# Patient Record
Sex: MALE | Race: WHITE | NOT HISPANIC OR LATINO | Employment: UNEMPLOYED | URBAN - METROPOLITAN AREA
[De-identification: names, ages, dates, MRNs, and addresses within clinical notes are randomized per-mention and may not be internally consistent; named-entity substitution may affect disease eponyms.]

---

## 2020-08-15 ENCOUNTER — NURSE TRIAGE (OUTPATIENT)
Dept: ADMINISTRATIVE | Facility: CLINIC | Age: 40
End: 2020-08-15

## 2020-08-15 NOTE — TELEPHONE ENCOUNTER
"Patient states his girlfriend had sex with some one else, has pubic itching, wants to get tested. Options for [facility], [facility] and Urgent Care given. States he cannot wait until Monday. [facility] given for option for today. Strictly advised to practice " safe sex" as he states he has numerous partners, one of whom is pregnant. Call ended due to graphic nature of patient's description of video of his girlfriend's encounter with another partner. Patient did verb understanding of need to follow up and practice " safe sex"    Reason for Disposition   Severe itching   Questions about Sexually Transmitted Disease or Infection (STD, STI), but no symptoms   Molluscum Contagiosum (Genital), questions about   Symptoms of a Sexually Transmitted Disease or Infection (STD, STI)   Penis or scrotal symptoms (i.e., rash, sores, itching)   Patient is worried about a sexually transmitted disease (STD)    Additional Information   Negative: Followed a genital area injury   Negative: Pain or burning with passing urine is main symptom   Negative: Pain in scrotum or testicle is main symptom   Negative: Swollen scrotum OR lump in the scrotum/groin area   Negative: Pubic lice suspected   Negative: [1] Blood from end of penis AND [2] large amount   Negative: [1] Not circumcised AND [2] foreskin pulled back and stuck   Negative: [1] Looks infected (e.g., draining sore, ulcer, rash is painful to touch) AND [2] fever > 100.4 F (38.0 C)   Negative: [1] Unable to urinate (or only a few drops) > 4 hours AND [2] bladder feels very full (e.g., palpable bladder or strong urge to urinate)   Negative: [1] Prolonged unwanted erection (i.e., not related to sexual interest) AND [2] lasting > 4 hours   Negative: SEVERE pain (e.g., excruciating)   Negative: Patient sounds very sick or weak to the triager   Negative: [1] Pus or bloody discharge from the end of the penis AND [2] fever   Negative: [1] Pain or " burning with passing urine AND [2] fever > 100.4 F (38.0 C)   Negative: [1] Pain or burning with passing urine AND [2] side (flank) or back pain present   Negative: Entire penis is swollen (i.e., edema)   Negative: [1] Antibiotic treatment > 3 days for STD (e.g., penile discharge from gonorrhea, chlamydia) AND [2] painful urination not improved   Negative: Pus (white, yellow) or bloody discharge from end of penis   Negative: Pain or burning with passing urine   Negative: [1] Not circumcised AND [2] swollen foreskin   Negative: [1] Tiny water blisters rash AND [2] 3 or more   Negative: Looks infected (e.g., draining sore, ulcer, rash is painful to touch)   Negative: Blood in urine (red, pink, or tea-colored)   Negative: Vaginal discharge   Negative: Vulvar symptoms (i.e., sores, redness, blisters, lumps, itching)   Negative: Penis discharge   Negative: Followed a genital area injury   Negative: Pain or burning with passing urine is main symptom   Negative: Pain in scrotum or testicle is main symptom   Negative: Swollen scrotum OR lump in the scrotum/groin area   Negative: Pubic lice suspected   Negative: [1] Blood from end of penis AND [2] large amount   Negative: [1] Not circumcised AND [2] foreskin pulled back and stuck   Negative: [1] Looks infected (e.g., draining sore, ulcer, rash is painful to touch) AND [2] fever > 100.5 F (38.1 C)   Negative: [1] Unable to urinate (or only a few drops) > 4 hours AND     [2] bladder feels very full (e.g., palpable bladder or strong urge to urinate)   Negative: [1] Erection AND [2] present > 4 hours   Negative: Patient sounds very sick or weak to the triager   Negative: [1] Pus or bloody discharge from the end of the penis AND [2] fever   Negative: [1] Pain or burning with passing urine AND [2] fever > 100.5 F (38.1 C)   Negative: [1] Pain or burning with passing urine AND [2] side (flank) or back pain present   Negative: Entire penis is swollen  (i.e., edema)   Negative: [1] Antibiotic treatment > 3 days for STD (e.g., penile discharge from gonorrhea, chlamydia)    AND [2] painful urination not improved   Negative: Pus (white, yellow) or bloody discharge from end of penis   Negative: Pain or burning with passing urine   Negative: [1] Not circumcised AND [2] swollen foreskin   Negative: [1] Tiny water blisters rash AND [2] 3 or more   Negative: Looks infected (e.g., draining sore, ulcer, rash is painful to touch)   Negative: Blood in urine (red, pink, or tea-colored)   Negative: [1] Painless rash (e.g., redness, tiny bumps, sore) AND [2] present > 24 hours    Protocols used: PENIS AND SCROTUM SYMPTOMS-A-, ST STDS - GUIDELINE [id number], ST STD [id number], ST PENIS AND SCROTUM SYMPTOMS-A-

## 2025-03-02 ENCOUNTER — HOSPITAL ENCOUNTER (EMERGENCY)
Facility: HOSPITAL | Age: 45
Discharge: HOME | End: 2025-03-02
Attending: EMERGENCY MEDICINE

## 2025-03-02 VITALS
HEIGHT: 67 IN | RESPIRATION RATE: 18 BRPM | TEMPERATURE: 98 F | HEART RATE: 98 BPM | OXYGEN SATURATION: 100 % | WEIGHT: 200 LBS | DIASTOLIC BLOOD PRESSURE: 98 MMHG | SYSTOLIC BLOOD PRESSURE: 125 MMHG | BODY MASS INDEX: 31.39 KG/M2

## 2025-03-02 DIAGNOSIS — K08.89 PAIN, DENTAL: Primary | ICD-10-CM

## 2025-03-02 PROCEDURE — 99284 EMERGENCY DEPT VISIT MOD MDM: CPT

## 2025-03-02 RX ORDER — KETOROLAC TROMETHAMINE 10 MG/1
10 TABLET, FILM COATED ORAL EVERY 6 HOURS PRN
Qty: 20 TABLET | Refills: 0 | Status: SHIPPED | OUTPATIENT
Start: 2025-03-02 | End: 2025-03-07

## 2025-03-02 RX ORDER — AMOXICILLIN 500 MG/1
500 TABLET, FILM COATED ORAL EVERY 12 HOURS
Qty: 20 TABLET | Refills: 0 | Status: SHIPPED | OUTPATIENT
Start: 2025-03-02 | End: 2025-03-12

## 2025-03-02 NOTE — DISCHARGE INSTRUCTIONS
Diagnosis:  Dental pain    You have several broken teeth, I am prescribing antibiotics to help clear any infection, amoxicillin as well as Toradol you can take as needed for pain. You should take Tylenol in addition to this as needed for pain up to 3 grams daily which is 6 of the 500 mg extra strength tablets.    Please call to schedule a follow up appointment with a dentist.  If you start to have any worsening symptoms please return to the emergency department.

## 2025-03-02 NOTE — Clinical Note
"Dirk Stroud "Dirk Mandelegler was seen and treated in our emergency department on 3/2/2025.  He may return to work on 03/03/2025.       If you have any questions or concerns, please don't hesitate to call.      Yesi Izaguirre PA-C"

## 2025-03-02 NOTE — ED PROVIDER NOTES
"Encounter Date: 3/2/2025       History     Chief Complaint   Patient presents with    Dental Pain     Both side in lower jaw     44-year-old male with history of dental infection presents for dental pain.  Reports that he has multiple teeth which need to be pulled but has been unable to obtain dental care as he has a new job.  Over the last 2 days he has had throbbing pain, mostly on the right side of his mouth and has been unable to work due to the pain.  He is requesting a work note.  He had "a pus pocket" which started to self drain.  He denies fever, swallowing or other complaints.      Review of patient's allergies indicates:  No Known Allergies  History reviewed. No pertinent past medical history.  History reviewed. No pertinent surgical history.  No family history on file.  Social History[1]  Review of Systems    Physical Exam     Initial Vitals [03/02/25 1050]   BP Pulse Resp Temp SpO2   (!) 125/98 98 18 97.5 °F (36.4 °C) 100 %      MAP       --         Physical Exam    Nursing note and vitals reviewed.  Constitutional: He appears well-developed and well-nourished.   HENT: Mouth/Throat: Abnormal dentition. Dental caries present. No dental abscesses.   Poor dentition with multiple broken teeth, multiple dental caries.  No dental abscess     Neurological: He is alert and oriented to person, place, and time.   Skin: Skin is warm and dry.         ED Course   Procedures  Labs Reviewed - No data to display         Imaging Results    None          Medications - No data to display  Medical Decision Making  44-year-old male presenting for dental pain.  /98 with otherwise normal vitals.  Nontoxic appearing.    Differential diagnosis includes broken teeth versus dental infection.  No clinical signs abscess.  Dental cement applied to broken teeth.  Will discharge with antibiotics, analgesics and referral information for dental clinics for follow-up. Stressed the importance of follow-up, strict ED return " precautions given.  Patient voiced understanding and is comfortable with discharge.     Risk  Prescription drug management.                                      Clinical Impression:  Final diagnoses:  [K08.89] Pain, dental (Primary)          ED Disposition Condition    Discharge Stable          ED Prescriptions       Medication Sig Dispense Start Date End Date Auth. Provider    amoxicillin (AMOXIL) 500 MG Tab Take 1 tablet (500 mg total) by mouth every 12 (twelve) hours. for 10 days 20 tablet 3/2/2025 3/12/2025 Yesi Izaguirre PA-C    ketorolac (TORADOL) 10 mg tablet Take 1 tablet (10 mg total) by mouth every 6 (six) hours as needed for Pain. 20 tablet 3/2/2025 3/7/2025 Yesi Izaguirre PA-C          Follow-up Information       Follow up With Specialties Details Why Contact [facility] - Dental Dental General Practice, Oral and Maxillofacial Surgery, Oral Surgery Schedule an appointment as soon as possible for a visit in 1 week  [address] [facility] [id number]  [phone]      [facility] - Emergency Dept Emergency Medicine Go to  If symptoms worsen [address] [facility] [address] [phone]               [1]   Social History  Tobacco Use    Smoking status: Never   Substance Use Topics    Alcohol use: No    Drug use: No        Yesi Izaguirre PA-C  03/02/25 8631

## 2025-05-08 ENCOUNTER — HOSPITAL ENCOUNTER (INPATIENT)
Facility: HOSPITAL | Age: 45
LOS: 6 days | Discharge: HOME | DRG: 269 | End: 2025-05-14
Attending: EMERGENCY MEDICINE | Admitting: SURGERY
Payer: MEDICAID

## 2025-05-08 ENCOUNTER — ANESTHESIA EVENT (OUTPATIENT)
Dept: SURGERY | Facility: HOSPITAL | Age: 45
End: 2025-05-08
Payer: MEDICAID

## 2025-05-08 ENCOUNTER — ANESTHESIA (OUTPATIENT)
Dept: SURGERY | Facility: HOSPITAL | Age: 45
End: 2025-05-08
Payer: MEDICAID

## 2025-05-08 DIAGNOSIS — I71.40 AAA (ABDOMINAL AORTIC ANEURYSM): ICD-10-CM

## 2025-05-08 DIAGNOSIS — I71.00 DISSECTION OF AORTA, UNSPECIFIED PORTION OF AORTA: ICD-10-CM

## 2025-05-08 DIAGNOSIS — R10.9 ABDOMINAL PAIN: ICD-10-CM

## 2025-05-08 DIAGNOSIS — I71.33 RUPTURED INFRARENAL ABDOMINAL AORTIC ANEURYSM (AAA): Primary | ICD-10-CM

## 2025-05-08 PROBLEM — I71.43 INFRARENAL ABDOMINAL AORTIC ANEURYSM (AAA) WITHOUT RUPTURE: Status: ACTIVE | Noted: 2025-05-08

## 2025-05-08 LAB
ABO + RH BLD: NORMAL
ABORH RETYPE: NORMAL
ABSOLUTE EOSINOPHIL (OHS): 0 K/UL
ABSOLUTE EOSINOPHIL (OHS): 0 K/UL
ABSOLUTE EOSINOPHIL (OHS): 0.01 K/UL
ABSOLUTE EOSINOPHIL (OHS): 0.02 K/UL
ABSOLUTE EOSINOPHIL (OHS): 0.05 K/UL
ABSOLUTE MONOCYTE (OHS): 0.78 K/UL (ref 0.3–1)
ABSOLUTE MONOCYTE (OHS): 1.23 K/UL (ref 0.3–1)
ABSOLUTE MONOCYTE (OHS): 1.31 K/UL (ref 0.3–1)
ABSOLUTE MONOCYTE (OHS): 1.4 K/UL (ref 0.3–1)
ABSOLUTE MONOCYTE (OHS): 1.41 K/UL (ref 0.3–1)
ABSOLUTE NEUTROPHIL COUNT (OHS): 12.19 K/UL (ref 1.8–7.7)
ABSOLUTE NEUTROPHIL COUNT (OHS): 12.53 K/UL (ref 1.8–7.7)
ABSOLUTE NEUTROPHIL COUNT (OHS): 15.19 K/UL (ref 1.8–7.7)
ABSOLUTE NEUTROPHIL COUNT (OHS): 17.6 K/UL (ref 1.8–7.7)
ABSOLUTE NEUTROPHIL COUNT (OHS): 8.91 K/UL (ref 1.8–7.7)
ALBUMIN SERPL BCP-MCNC: 2.3 G/DL (ref 3.5–5.2)
ALBUMIN SERPL BCP-MCNC: 2.3 G/DL (ref 3.5–5.2)
ALBUMIN SERPL BCP-MCNC: 3.9 G/DL (ref 3.5–5.2)
ALLENS TEST: ABNORMAL
ALLENS TEST: ABNORMAL
ALP SERPL-CCNC: 44 UNIT/L (ref 40–150)
ALP SERPL-CCNC: 48 UNIT/L (ref 40–150)
ALP SERPL-CCNC: 99 UNIT/L (ref 40–150)
ALT SERPL W/O P-5'-P-CCNC: 15 UNIT/L (ref 10–44)
ALT SERPL W/O P-5'-P-CCNC: 17 UNIT/L (ref 10–44)
ALT SERPL W/O P-5'-P-CCNC: 18 UNIT/L (ref 10–44)
AMPHET UR QL SCN: NEGATIVE
AMPHET UR QL SCN: NEGATIVE
ANION GAP (OHS): 14 MMOL/L (ref 8–16)
ANION GAP (OHS): 5 MMOL/L (ref 8–16)
ANION GAP (OHS): 6 MMOL/L (ref 8–16)
APTT PPP: 26.9 SECONDS (ref 21–32)
APTT PPP: 27.1 SECONDS (ref 21–32)
AST SERPL-CCNC: 17 UNIT/L (ref 11–45)
AST SERPL-CCNC: 31 UNIT/L (ref 11–45)
AST SERPL-CCNC: 47 UNIT/L (ref 11–45)
BACTERIA #/AREA URNS AUTO: ABNORMAL /HPF
BARBITURATE SCN PRESENT UR: NEGATIVE
BARBITURATE SCN PRESENT UR: NEGATIVE
BASOPHILS # BLD AUTO: 0.03 K/UL
BASOPHILS # BLD AUTO: 0.03 K/UL
BASOPHILS # BLD AUTO: 0.04 K/UL
BASOPHILS # BLD AUTO: 0.05 K/UL
BASOPHILS # BLD AUTO: 0.05 K/UL
BASOPHILS NFR BLD AUTO: 0.2 %
BASOPHILS NFR BLD AUTO: 0.4 %
BENZODIAZ UR QL SCN: ABNORMAL
BENZODIAZ UR QL SCN: NEGATIVE
BILIRUB SERPL-MCNC: 2.4 MG/DL (ref 0.1–1)
BILIRUB SERPL-MCNC: 4.3 MG/DL (ref 0.1–1)
BILIRUB SERPL-MCNC: 5.6 MG/DL (ref 0.1–1)
BILIRUB UR QL STRIP.AUTO: ABNORMAL
BLD PROD TYP BPU: NORMAL
BLOOD UNIT EXPIRATION DATE: NORMAL
BLOOD UNIT TYPE CODE: 5100
BLOOD UNIT TYPE CODE: 600
BLOOD UNIT TYPE CODE: 6200
BLOOD UNIT TYPE CODE: 9500
BLOOD UNIT TYPE CODE: 9500
BUN SERPL-MCNC: 10 MG/DL (ref 6–20)
BUN SERPL-MCNC: 12 MG/DL (ref 6–20)
BUN SERPL-MCNC: 13 MG/DL (ref 6–30)
BUN SERPL-MCNC: 8 MG/DL (ref 6–20)
CALCIUM SERPL-MCNC: 10 MG/DL (ref 8.7–10.5)
CALCIUM SERPL-MCNC: 8.1 MG/DL (ref 8.7–10.5)
CALCIUM SERPL-MCNC: 9.1 MG/DL (ref 8.7–10.5)
CANNABINOIDS UR QL SCN: ABNORMAL
CANNABINOIDS UR QL SCN: ABNORMAL
CHLORIDE SERPL-SCNC: 107 MMOL/L (ref 95–110)
CHLORIDE SERPL-SCNC: 109 MMOL/L (ref 95–110)
CHLORIDE SERPL-SCNC: 95 MMOL/L (ref 95–110)
CHLORIDE SERPL-SCNC: 95 MMOL/L (ref 95–110)
CLARITY UR: CLEAR
CO2 SERPL-SCNC: 21 MMOL/L (ref 23–29)
CO2 SERPL-SCNC: 21 MMOL/L (ref 23–29)
CO2 SERPL-SCNC: 24 MMOL/L (ref 23–29)
COCAINE UR QL SCN: NEGATIVE
COCAINE UR QL SCN: NEGATIVE
COLOR UR AUTO: YELLOW
CREAT SERPL-MCNC: 0.6 MG/DL (ref 0.5–1.4)
CREAT SERPL-MCNC: 0.7 MG/DL (ref 0.5–1.4)
CREAT SERPL-MCNC: 1 MG/DL (ref 0.5–1.4)
CREAT SERPL-MCNC: 1.1 MG/DL (ref 0.5–1.4)
CREAT UR-MCNC: 49 MG/DL (ref 23–375)
CREAT UR-MCNC: >450 MG/DL (ref 23–375)
CROSSMATCH INTERPRETATION: NORMAL
DELSYS: ABNORMAL
DELSYS: ABNORMAL
DISPENSE STATUS: NORMAL
ERYTHROCYTE [DISTWIDTH] IN BLOOD BY AUTOMATED COUNT: 12.4 % (ref 11.5–14.5)
ERYTHROCYTE [DISTWIDTH] IN BLOOD BY AUTOMATED COUNT: 13.6 % (ref 11.5–14.5)
ERYTHROCYTE [DISTWIDTH] IN BLOOD BY AUTOMATED COUNT: 13.7 % (ref 11.5–14.5)
ERYTHROCYTE [DISTWIDTH] IN BLOOD BY AUTOMATED COUNT: 14 % (ref 11.5–14.5)
ERYTHROCYTE [DISTWIDTH] IN BLOOD BY AUTOMATED COUNT: 14.3 % (ref 11.5–14.5)
ERYTHROCYTE [SEDIMENTATION RATE] IN BLOOD BY WESTERGREN METHOD: 20 MM/H
ERYTHROCYTE [SEDIMENTATION RATE] IN BLOOD BY WESTERGREN METHOD: 20 MM/H
ETHANOL UR-MCNC: <10 MG/DL
ETHANOL UR-MCNC: <10 MG/DL
FIBRINOGEN PPP-MCNC: 269 MG/DL (ref 182–400)
FIBRINOGEN PPP-MCNC: 302 MG/DL (ref 182–400)
FIO2: 100
FIO2: 40
GFR SERPLBLD CREATININE-BSD FMLA CKD-EPI: >60 ML/MIN/1.73/M2
GLUCOSE SERPL-MCNC: 113 MG/DL (ref 70–110)
GLUCOSE SERPL-MCNC: 128 MG/DL (ref 70–110)
GLUCOSE SERPL-MCNC: 140 MG/DL (ref 70–110)
GLUCOSE SERPL-MCNC: 141 MG/DL (ref 70–110)
GLUCOSE SERPL-MCNC: 147 MG/DL (ref 70–110)
GLUCOSE SERPL-MCNC: 148 MG/DL (ref 70–110)
GLUCOSE SERPL-MCNC: 162 MG/DL (ref 70–110)
GLUCOSE SERPL-MCNC: 199 MG/DL (ref 70–110)
GLUCOSE SERPL-MCNC: 203 MG/DL (ref 70–110)
GLUCOSE UR QL STRIP: NEGATIVE
HCO3 UR-SCNC: 19 MMOL/L (ref 24–28)
HCO3 UR-SCNC: 19.2 MMOL/L (ref 24–28)
HCO3 UR-SCNC: 21.5 MMOL/L (ref 24–28)
HCO3 UR-SCNC: 21.9 MMOL/L (ref 24–28)
HCO3 UR-SCNC: 22 MMOL/L (ref 24–28)
HCO3 UR-SCNC: 22.5 MMOL/L (ref 24–28)
HCO3 UR-SCNC: 22.9 MMOL/L (ref 24–28)
HCT VFR BLD AUTO: 33.4 % (ref 40–54)
HCT VFR BLD AUTO: 34.3 % (ref 40–54)
HCT VFR BLD AUTO: 35.2 % (ref 40–54)
HCT VFR BLD AUTO: 36.7 % (ref 40–54)
HCT VFR BLD AUTO: 44.5 % (ref 40–54)
HCT VFR BLD CALC: 28 %PCV (ref 36–54)
HCT VFR BLD CALC: 29 %PCV (ref 36–54)
HCT VFR BLD CALC: 33 %PCV (ref 36–54)
HCT VFR BLD CALC: 35 %PCV (ref 36–54)
HCT VFR BLD CALC: 38 %PCV (ref 36–54)
HCT VFR BLD CALC: 49 %PCV (ref 36–54)
HCV AB SERPL QL IA: NORMAL
HGB BLD-MCNC: 11.1 GM/DL (ref 14–18)
HGB BLD-MCNC: 11.3 GM/DL (ref 14–18)
HGB BLD-MCNC: 12.3 GM/DL (ref 14–18)
HGB BLD-MCNC: 12.3 GM/DL (ref 14–18)
HGB BLD-MCNC: 15.3 GM/DL (ref 14–18)
HGB UR QL STRIP: ABNORMAL
HIV 1+2 AB+HIV1 P24 AG SERPL QL IA: NORMAL
HOLD SPECIMEN: NORMAL
HYALINE CASTS UR QL AUTO: 44 /LPF (ref 0–1)
IMM GRANULOCYTES # BLD AUTO: 0.06 K/UL (ref 0–0.04)
IMM GRANULOCYTES # BLD AUTO: 0.09 K/UL (ref 0–0.04)
IMM GRANULOCYTES # BLD AUTO: 0.12 K/UL (ref 0–0.04)
IMM GRANULOCYTES # BLD AUTO: 0.23 K/UL (ref 0–0.04)
IMM GRANULOCYTES # BLD AUTO: 0.47 K/UL (ref 0–0.04)
IMM GRANULOCYTES NFR BLD AUTO: 0.4 % (ref 0–0.5)
IMM GRANULOCYTES NFR BLD AUTO: 0.6 % (ref 0–0.5)
IMM GRANULOCYTES NFR BLD AUTO: 0.6 % (ref 0–0.5)
IMM GRANULOCYTES NFR BLD AUTO: 1.5 % (ref 0–0.5)
IMM GRANULOCYTES NFR BLD AUTO: 2.3 % (ref 0–0.5)
INDIRECT COOMBS: NORMAL
INR PPP: 1.1 (ref 0.8–1.2)
INR PPP: 1.1 (ref 0.8–1.2)
INR PPP: 1.3 (ref 0.8–1.2)
KETONES UR QL STRIP: ABNORMAL
LACTATE SERPL-SCNC: 2.5 MMOL/L (ref 0.5–2.2)
LDH SERPL L TO P-CCNC: 2.52 MMOL/L (ref 0.36–1.25)
LDH SERPL L TO P-CCNC: 4.5 MMOL/L (ref 0.5–2.2)
LEUKOCYTE ESTERASE UR QL STRIP: NEGATIVE
LIPASE SERPL-CCNC: 15 U/L (ref 4–60)
LYMPHOCYTES # BLD AUTO: 1.13 K/UL (ref 1–4.8)
LYMPHOCYTES # BLD AUTO: 1.72 K/UL (ref 1–4.8)
LYMPHOCYTES # BLD AUTO: 1.89 K/UL (ref 1–4.8)
LYMPHOCYTES # BLD AUTO: 1.92 K/UL (ref 1–4.8)
LYMPHOCYTES # BLD AUTO: 3.33 K/UL (ref 1–4.8)
MAGNESIUM SERPL-MCNC: 1.5 MG/DL (ref 1.6–2.6)
MCH RBC QN AUTO: 29.1 PG (ref 27–31)
MCH RBC QN AUTO: 29.1 PG (ref 27–31)
MCH RBC QN AUTO: 29.4 PG (ref 27–31)
MCH RBC QN AUTO: 29.5 PG (ref 27–31)
MCH RBC QN AUTO: 29.9 PG (ref 27–31)
MCHC RBC AUTO-ENTMCNC: 32.9 G/DL (ref 32–36)
MCHC RBC AUTO-ENTMCNC: 33.2 G/DL (ref 32–36)
MCHC RBC AUTO-ENTMCNC: 33.5 G/DL (ref 32–36)
MCHC RBC AUTO-ENTMCNC: 34.4 G/DL (ref 32–36)
MCHC RBC AUTO-ENTMCNC: 34.9 G/DL (ref 32–36)
MCV RBC AUTO: 84 FL (ref 82–98)
MCV RBC AUTO: 87 FL (ref 82–98)
MCV RBC AUTO: 87 FL (ref 82–98)
MCV RBC AUTO: 88 FL (ref 82–98)
MCV RBC AUTO: 89 FL (ref 82–98)
METHADONE UR QL SCN: NEGATIVE
METHADONE UR QL SCN: NEGATIVE
MICROSCOPIC COMMENT: ABNORMAL
MODE: ABNORMAL
MODE: ABNORMAL
NITRITE UR QL STRIP: NEGATIVE
NUCLEATED RBC (/100WBC) (OHS): 0 /100 WBC
OHS QRS DURATION: 88 MS
OHS QTC CALCULATION: 441 MS
OPIATES UR QL SCN: ABNORMAL
OPIATES UR QL SCN: ABNORMAL
PCO2 BLDA: 35.1 MMHG (ref 35–45)
PCO2 BLDA: 36.6 MMHG (ref 35–45)
PCO2 BLDA: 37.5 MMHG (ref 35–45)
PCO2 BLDA: 37.9 MMHG (ref 35–45)
PCO2 BLDA: 38.6 MMHG (ref 35–45)
PCO2 BLDA: 39.2 MMHG (ref 35–45)
PCO2 BLDA: 40.5 MMHG (ref 35–45)
PCP UR QL: NEGATIVE
PCP UR QL: NEGATIVE
PEEP: 5
PEEP: 5
PH SMN: 7.33 [PH] (ref 7.35–7.45)
PH SMN: 7.34 [PH] (ref 7.35–7.45)
PH SMN: 7.36 [PH] (ref 7.35–7.45)
PH SMN: 7.36 [PH] (ref 7.35–7.45)
PH SMN: 7.37 [PH] (ref 7.35–7.45)
PH SMN: 7.37 [PH] (ref 7.35–7.45)
PH SMN: 7.38 [PH] (ref 7.35–7.45)
PH UR STRIP: 6 [PH]
PHOSPHATE SERPL-MCNC: 3.2 MG/DL (ref 2.7–4.5)
PLATELET # BLD AUTO: 144 K/UL (ref 150–450)
PLATELET # BLD AUTO: 147 K/UL (ref 150–450)
PLATELET # BLD AUTO: 154 K/UL (ref 150–450)
PLATELET # BLD AUTO: 171 K/UL (ref 150–450)
PLATELET # BLD AUTO: 442 K/UL (ref 150–450)
PLATELET BLD QL SMEAR: ABNORMAL
PMV BLD AUTO: 9.3 FL (ref 9.2–12.9)
PMV BLD AUTO: 9.3 FL (ref 9.2–12.9)
PMV BLD AUTO: 9.4 FL (ref 9.2–12.9)
PMV BLD AUTO: 9.8 FL (ref 9.2–12.9)
PMV BLD AUTO: 9.8 FL (ref 9.2–12.9)
PO2 BLDA: 181 MMHG (ref 80–100)
PO2 BLDA: 188 MMHG (ref 80–100)
PO2 BLDA: 227 MMHG (ref 80–100)
PO2 BLDA: 268 MMHG (ref 80–100)
PO2 BLDA: 358 MMHG (ref 80–100)
PO2 BLDA: 527 MMHG (ref 80–100)
PO2 BLDA: 606 MMHG (ref 80–100)
POC ACTIVATED CLOTTING TIME K: 181 SEC (ref 74–137)
POC ACTIVATED CLOTTING TIME K: 181 SEC (ref 74–137)
POC ACTIVATED CLOTTING TIME K: 187 SEC (ref 74–137)
POC ACTIVATED CLOTTING TIME K: 193 SEC (ref 74–137)
POC ACTIVATED CLOTTING TIME K: 227 SEC (ref 74–137)
POC ACTIVATED CLOTTING TIME K: 250 SEC (ref 74–137)
POC ACTIVATED CLOTTING TIME K: 256 SEC (ref 74–137)
POC ACTIVATED CLOTTING TIME K: 268 SEC (ref 74–137)
POC ACTIVATED CLOTTING TIME K: 77 SEC (ref 74–137)
POC BE: -3 MMOL/L (ref -2–2)
POC BE: -4 MMOL/L (ref -2–2)
POC BE: -4 MMOL/L (ref -2–2)
POC BE: -7 MMOL/L (ref -2–2)
POC BE: -7 MMOL/L (ref -2–2)
POC IONIZED CALCIUM: 1.12 MMOL/L (ref 1.06–1.42)
POC IONIZED CALCIUM: 1.19 MMOL/L (ref 1.06–1.42)
POC IONIZED CALCIUM: 1.27 MMOL/L (ref 1.06–1.42)
POC IONIZED CALCIUM: 1.38 MMOL/L (ref 1.06–1.42)
POC IONIZED CALCIUM: 1.45 MMOL/L (ref 1.06–1.42)
POC IONIZED CALCIUM: 1.47 MMOL/L (ref 1.06–1.42)
POC IONIZED CALCIUM: 1.47 MMOL/L (ref 1.06–1.42)
POC IONIZED CALCIUM: 1.49 MMOL/L (ref 1.06–1.42)
POC SATURATED O2: 100 % (ref 95–100)
POC TCO2 (MEASURED): 27 MMOL/L (ref 23–29)
POC TCO2: 20 MMOL/L (ref 23–27)
POC TCO2: 20 MMOL/L (ref 23–27)
POC TCO2: 23 MMOL/L (ref 23–27)
POC TCO2: 24 MMOL/L (ref 23–27)
POC TCO2: 24 MMOL/L (ref 23–27)
POCT GLUCOSE: 106 MG/DL (ref 70–110)
POCT GLUCOSE: 108 MG/DL (ref 70–110)
POCT GLUCOSE: 110 MG/DL (ref 70–110)
POCT GLUCOSE: 115 MG/DL (ref 70–110)
POCT GLUCOSE: 122 MG/DL (ref 70–110)
POCT GLUCOSE: 122 MG/DL (ref 70–110)
POCT GLUCOSE: 132 MG/DL (ref 70–110)
POTASSIUM BLD-SCNC: 3.3 MMOL/L (ref 3.5–5.1)
POTASSIUM BLD-SCNC: 4.1 MMOL/L (ref 3.5–5.1)
POTASSIUM BLD-SCNC: 4.2 MMOL/L (ref 3.5–5.1)
POTASSIUM BLD-SCNC: 4.4 MMOL/L (ref 3.5–5.1)
POTASSIUM SERPL-SCNC: 3.3 MMOL/L (ref 3.5–5.1)
POTASSIUM SERPL-SCNC: 3.8 MMOL/L (ref 3.5–5.1)
POTASSIUM SERPL-SCNC: 4.4 MMOL/L (ref 3.5–5.1)
PROT SERPL-MCNC: 4.6 GM/DL (ref 6–8.4)
PROT SERPL-MCNC: 4.6 GM/DL (ref 6–8.4)
PROT SERPL-MCNC: 7.8 GM/DL (ref 6–8.4)
PROT UR QL STRIP: ABNORMAL
PROTHROMBIN TIME: 11.6 SECONDS (ref 9–12.5)
PROTHROMBIN TIME: 11.8 SECONDS (ref 9–12.5)
PROTHROMBIN TIME: 13.6 SECONDS (ref 9–12.5)
RBC # BLD AUTO: 3.81 M/UL (ref 4.6–6.2)
RBC # BLD AUTO: 3.84 M/UL (ref 4.6–6.2)
RBC # BLD AUTO: 4.17 M/UL (ref 4.6–6.2)
RBC # BLD AUTO: 4.23 M/UL (ref 4.6–6.2)
RBC # BLD AUTO: 5.11 M/UL (ref 4.6–6.2)
RBC #/AREA URNS AUTO: 5 /HPF (ref 0–4)
RELATIVE EOSINOPHIL (OHS): 0 %
RELATIVE EOSINOPHIL (OHS): 0 %
RELATIVE EOSINOPHIL (OHS): 0.1 %
RELATIVE EOSINOPHIL (OHS): 0.1 %
RELATIVE EOSINOPHIL (OHS): 0.4 %
RELATIVE LYMPHOCYTE (OHS): 10.1 % (ref 18–48)
RELATIVE LYMPHOCYTE (OHS): 12.4 % (ref 18–48)
RELATIVE LYMPHOCYTE (OHS): 24.1 % (ref 18–48)
RELATIVE LYMPHOCYTE (OHS): 7.4 % (ref 18–48)
RELATIVE LYMPHOCYTE (OHS): 8.3 % (ref 18–48)
RELATIVE MONOCYTE (OHS): 10.1 % (ref 4–15)
RELATIVE MONOCYTE (OHS): 3.8 % (ref 4–15)
RELATIVE MONOCYTE (OHS): 7.6 % (ref 4–15)
RELATIVE MONOCYTE (OHS): 8 % (ref 4–15)
RELATIVE MONOCYTE (OHS): 8.6 % (ref 4–15)
RELATIVE NEUTROPHIL (OHS): 64.6 % (ref 38–73)
RELATIVE NEUTROPHIL (OHS): 78.8 % (ref 38–73)
RELATIVE NEUTROPHIL (OHS): 81.5 % (ref 38–73)
RELATIVE NEUTROPHIL (OHS): 82.2 % (ref 38–73)
RELATIVE NEUTROPHIL (OHS): 85.3 % (ref 38–73)
RH BLD: NORMAL
SAMPLE: ABNORMAL
SAMPLE: NORMAL
SITE: ABNORMAL
SITE: ABNORMAL
SODIUM BLD-SCNC: 134 MMOL/L (ref 136–145)
SODIUM BLD-SCNC: 137 MMOL/L (ref 136–145)
SODIUM BLD-SCNC: 138 MMOL/L (ref 136–145)
SODIUM BLD-SCNC: 139 MMOL/L (ref 136–145)
SODIUM BLD-SCNC: 139 MMOL/L (ref 136–145)
SODIUM SERPL-SCNC: 133 MMOL/L (ref 136–145)
SODIUM SERPL-SCNC: 134 MMOL/L (ref 136–145)
SODIUM SERPL-SCNC: 135 MMOL/L (ref 136–145)
SP GR UR STRIP: >=1.03
SPECIMEN OUTDATE: NORMAL
SQUAMOUS #/AREA URNS AUTO: 1 /HPF
UNIT NUMBER: NORMAL
UROBILINOGEN UR STRIP-ACNC: ABNORMAL EU/DL
VT: 430
VT: 430
WBC # BLD AUTO: 13.8 K/UL (ref 3.9–12.7)
WBC # BLD AUTO: 15.24 K/UL (ref 3.9–12.7)
WBC # BLD AUTO: 15.46 K/UL (ref 3.9–12.7)
WBC # BLD AUTO: 18.65 K/UL (ref 3.9–12.7)
WBC # BLD AUTO: 20.64 K/UL (ref 3.9–12.7)
WBC #/AREA URNS AUTO: 3 /HPF (ref 0–5)

## 2025-05-08 PROCEDURE — 85025 COMPLETE CBC W/AUTO DIFF WBC: CPT

## 2025-05-08 PROCEDURE — 86901 BLOOD TYPING SEROLOGIC RH(D): CPT | Performed by: EMERGENCY MEDICINE

## 2025-05-08 PROCEDURE — 04R00JZ REPLACEMENT OF ABDOMINAL AORTA WITH SYNTHETIC SUBSTITUTE, OPEN APPROACH: ICD-10-PCS | Performed by: SURGERY

## 2025-05-08 PROCEDURE — 85610 PROTHROMBIN TIME: CPT

## 2025-05-08 PROCEDURE — 27201037 HC PRESSURE MONITORING SET UP

## 2025-05-08 PROCEDURE — 37799 UNLISTED PX VASCULAR SURGERY: CPT

## 2025-05-08 PROCEDURE — 83605 ASSAY OF LACTIC ACID: CPT

## 2025-05-08 PROCEDURE — 86920 COMPATIBILITY TEST SPIN: CPT | Performed by: SURGERY

## 2025-05-08 PROCEDURE — 35082 REPAIR ARTERY RUPTURE AORTA: CPT | Mod: 62,22,, | Performed by: SURGERY

## 2025-05-08 PROCEDURE — 20000000 HC ICU ROOM

## 2025-05-08 PROCEDURE — P9012 CRYOPRECIPITATE EACH UNIT: HCPCS | Performed by: SURGERY

## 2025-05-08 PROCEDURE — 96376 TX/PRO/DX INJ SAME DRUG ADON: CPT

## 2025-05-08 PROCEDURE — 85014 HEMATOCRIT: CPT

## 2025-05-08 PROCEDURE — 99223 1ST HOSP IP/OBS HIGH 75: CPT | Mod: 57,,, | Performed by: SURGERY

## 2025-05-08 PROCEDURE — 85025 COMPLETE CBC W/AUTO DIFF WBC: CPT | Performed by: STUDENT IN AN ORGANIZED HEALTH CARE EDUCATION/TRAINING PROGRAM

## 2025-05-08 PROCEDURE — P9017 PLASMA 1 DONOR FRZ W/IN 8 HR: HCPCS | Performed by: SURGERY

## 2025-05-08 PROCEDURE — 63600175 PHARM REV CODE 636 W HCPCS

## 2025-05-08 PROCEDURE — 99900026 HC AIRWAY MAINTENANCE (STAT)

## 2025-05-08 PROCEDURE — P9035 PLATELET PHERES LEUKOREDUCED: HCPCS | Performed by: SURGERY

## 2025-05-08 PROCEDURE — 83690 ASSAY OF LIPASE: CPT

## 2025-05-08 PROCEDURE — C1768 GRAFT, VASCULAR: HCPCS | Performed by: SURGERY

## 2025-05-08 PROCEDURE — 63600175 PHARM REV CODE 636 W HCPCS: Performed by: SURGERY

## 2025-05-08 PROCEDURE — 85730 THROMBOPLASTIN TIME PARTIAL: CPT

## 2025-05-08 PROCEDURE — 85384 FIBRINOGEN ACTIVITY: CPT

## 2025-05-08 PROCEDURE — 36000710: Performed by: SURGERY

## 2025-05-08 PROCEDURE — 94761 N-INVAS EAR/PLS OXIMETRY MLT: CPT | Mod: XB

## 2025-05-08 PROCEDURE — 96361 HYDRATE IV INFUSION ADD-ON: CPT

## 2025-05-08 PROCEDURE — 86965 POOLING BLOOD PLATELETS: CPT | Performed by: SURGERY

## 2025-05-08 PROCEDURE — 96375 TX/PRO/DX INJ NEW DRUG ADDON: CPT

## 2025-05-08 PROCEDURE — 84132 ASSAY OF SERUM POTASSIUM: CPT

## 2025-05-08 PROCEDURE — 25500020 PHARM REV CODE 255: Performed by: EMERGENCY MEDICINE

## 2025-05-08 PROCEDURE — P9016 RBC LEUKOCYTES REDUCED: HCPCS | Performed by: STUDENT IN AN ORGANIZED HEALTH CARE EDUCATION/TRAINING PROGRAM

## 2025-05-08 PROCEDURE — 80053 COMPREHEN METABOLIC PANEL: CPT

## 2025-05-08 PROCEDURE — 25000003 PHARM REV CODE 250

## 2025-05-08 PROCEDURE — 96374 THER/PROPH/DIAG INJ IV PUSH: CPT | Mod: 59

## 2025-05-08 PROCEDURE — 27201423 OPTIME MED/SURG SUP & DEVICES STERILE SUPPLY: Performed by: SURGERY

## 2025-05-08 PROCEDURE — P9017 PLASMA 1 DONOR FRZ W/IN 8 HR: HCPCS

## 2025-05-08 PROCEDURE — 83735 ASSAY OF MAGNESIUM: CPT

## 2025-05-08 PROCEDURE — 84295 ASSAY OF SERUM SODIUM: CPT

## 2025-05-08 PROCEDURE — 36000711: Performed by: SURGERY

## 2025-05-08 PROCEDURE — 87389 HIV-1 AG W/HIV-1&-2 AB AG IA: CPT | Performed by: PHYSICIAN ASSISTANT

## 2025-05-08 PROCEDURE — 99900035 HC TECH TIME PER 15 MIN (STAT)

## 2025-05-08 PROCEDURE — 88304 TISSUE EXAM BY PATHOLOGIST: CPT | Mod: TC | Performed by: SURGERY

## 2025-05-08 PROCEDURE — 27100171 HC OXYGEN HIGH FLOW UP TO 24 HOURS

## 2025-05-08 PROCEDURE — 27201039 HC RAPID INFUSION SYSTEM (R.I.S.)

## 2025-05-08 PROCEDURE — 99285 EMERGENCY DEPT VISIT HI MDM: CPT | Mod: 25

## 2025-05-08 PROCEDURE — 82330 ASSAY OF CALCIUM: CPT

## 2025-05-08 PROCEDURE — P9017 PLASMA 1 DONOR FRZ W/IN 8 HR: HCPCS | Performed by: STUDENT IN AN ORGANIZED HEALTH CARE EDUCATION/TRAINING PROGRAM

## 2025-05-08 PROCEDURE — 27100088 HC CELL SAVER

## 2025-05-08 PROCEDURE — 27100025 HC TUBING, SET FLUID WARMER: Performed by: ANESTHESIOLOGY

## 2025-05-08 PROCEDURE — 82803 BLOOD GASES ANY COMBINATION: CPT

## 2025-05-08 PROCEDURE — 86920 COMPATIBILITY TEST SPIN: CPT | Performed by: STUDENT IN AN ORGANIZED HEALTH CARE EDUCATION/TRAINING PROGRAM

## 2025-05-08 PROCEDURE — 94002 VENT MGMT INPAT INIT DAY: CPT

## 2025-05-08 PROCEDURE — 99291 CRITICAL CARE FIRST HOUR: CPT | Mod: ,,, | Performed by: ANESTHESIOLOGY

## 2025-05-08 PROCEDURE — 37000008 HC ANESTHESIA 1ST 15 MINUTES: Performed by: SURGERY

## 2025-05-08 PROCEDURE — 80307 DRUG TEST PRSMV CHEM ANLYZR: CPT | Performed by: STUDENT IN AN ORGANIZED HEALTH CARE EDUCATION/TRAINING PROGRAM

## 2025-05-08 PROCEDURE — 37000009 HC ANESTHESIA EA ADD 15 MINS: Performed by: SURGERY

## 2025-05-08 PROCEDURE — 27100021 HC MULTIPORT INFUSION MANIFOLD: Performed by: ANESTHESIOLOGY

## 2025-05-08 PROCEDURE — 27800505 HC CATH, RADIAL ARTERY KIT: Performed by: ANESTHESIOLOGY

## 2025-05-08 PROCEDURE — 27201041 HC RESERVOIR, CARDIOTOMY

## 2025-05-08 PROCEDURE — 96365 THER/PROPH/DIAG IV INF INIT: CPT

## 2025-05-08 PROCEDURE — 81001 URINALYSIS AUTO W/SCOPE: CPT | Mod: XB

## 2025-05-08 PROCEDURE — 84100 ASSAY OF PHOSPHORUS: CPT

## 2025-05-08 PROCEDURE — 27200677 HC TRANSDUCER MONITOR KIT SINGLE: Performed by: ANESTHESIOLOGY

## 2025-05-08 PROCEDURE — 93005 ELECTROCARDIOGRAM TRACING: CPT

## 2025-05-08 PROCEDURE — 85002 BLEEDING TIME TEST: CPT

## 2025-05-08 PROCEDURE — 63600175 PHARM REV CODE 636 W HCPCS: Mod: JZ,TB | Performed by: EMERGENCY MEDICINE

## 2025-05-08 PROCEDURE — 02BF0ZX EXCISION OF AORTIC VALVE, OPEN APPROACH, DIAGNOSTIC: ICD-10-PCS | Performed by: SURGERY

## 2025-05-08 PROCEDURE — 80053 COMPREHEN METABOLIC PANEL: CPT | Performed by: STUDENT IN AN ORGANIZED HEALTH CARE EDUCATION/TRAINING PROGRAM

## 2025-05-08 PROCEDURE — 93010 ELECTROCARDIOGRAM REPORT: CPT | Mod: ,,, | Performed by: INTERNAL MEDICINE

## 2025-05-08 PROCEDURE — 86803 HEPATITIS C AB TEST: CPT | Performed by: PHYSICIAN ASSISTANT

## 2025-05-08 DEVICE — BARD® PTFE FELT, 2.5 CM X 15.2 CM
Type: IMPLANTABLE DEVICE | Site: AORTA | Status: FUNCTIONAL
Brand: BARD® PTFE FELT

## 2025-05-08 RX ORDER — NICARDIPINE HYDROCHLORIDE 0.2 MG/ML
0-15 INJECTION INTRAVENOUS CONTINUOUS
Status: DISCONTINUED | OUTPATIENT
Start: 2025-05-08 | End: 2025-05-08

## 2025-05-08 RX ORDER — HYDROCODONE BITARTRATE AND ACETAMINOPHEN 500; 5 MG/1; MG/1
TABLET ORAL
Status: DISCONTINUED | OUTPATIENT
Start: 2025-05-08 | End: 2025-05-09

## 2025-05-08 RX ORDER — HEPARIN SODIUM 1000 [USP'U]/ML
INJECTION, SOLUTION INTRAVENOUS; SUBCUTANEOUS
Status: DISCONTINUED | OUTPATIENT
Start: 2025-05-08 | End: 2025-05-08

## 2025-05-08 RX ORDER — ACETAMINOPHEN 500 MG
1000 TABLET ORAL EVERY 8 HOURS PRN
Status: DISCONTINUED | OUTPATIENT
Start: 2025-05-08 | End: 2025-05-08

## 2025-05-08 RX ORDER — MIDAZOLAM HYDROCHLORIDE 1 MG/ML
INJECTION INTRAMUSCULAR; INTRAVENOUS
Status: DISCONTINUED | OUTPATIENT
Start: 2025-05-08 | End: 2025-05-08

## 2025-05-08 RX ORDER — PROPOFOL 10 MG/ML
0-50 INJECTION, EMULSION INTRAVENOUS CONTINUOUS
Status: DISCONTINUED | OUTPATIENT
Start: 2025-05-08 | End: 2025-05-09

## 2025-05-08 RX ORDER — FENTANYL CITRATE 50 UG/ML
25 INJECTION, SOLUTION INTRAMUSCULAR; INTRAVENOUS EVERY 4 HOURS PRN
Refills: 0 | Status: DISCONTINUED | OUTPATIENT
Start: 2025-05-08 | End: 2025-05-08

## 2025-05-08 RX ORDER — KETAMINE HCL IN 0.9 % NACL 50 MG/5 ML
SYRINGE (ML) INTRAVENOUS
Status: DISCONTINUED | OUTPATIENT
Start: 2025-05-08 | End: 2025-05-08

## 2025-05-08 RX ORDER — CALCIUM CHLORIDE DIHYDRATE 100 MG/ML
INJECTION, SOLUTION INTRAVENOUS
Status: DISCONTINUED | OUTPATIENT
Start: 2025-05-08 | End: 2025-05-08

## 2025-05-08 RX ORDER — ESMOLOL HYDROCHLORIDE 10 MG/ML
INJECTION INTRAVENOUS
Status: DISCONTINUED | OUTPATIENT
Start: 2025-05-08 | End: 2025-05-08

## 2025-05-08 RX ORDER — CALCIUM GLUCONATE 20 MG/ML
3 INJECTION, SOLUTION INTRAVENOUS
Status: DISCONTINUED | OUTPATIENT
Start: 2025-05-08 | End: 2025-05-12

## 2025-05-08 RX ORDER — CALCIUM GLUCONATE 20 MG/ML
2 INJECTION, SOLUTION INTRAVENOUS
Status: DISCONTINUED | OUTPATIENT
Start: 2025-05-08 | End: 2025-05-12

## 2025-05-08 RX ORDER — HEPARIN SOD,PORCINE/0.9 % NACL 1000/500ML
INTRAVENOUS SOLUTION INTRAVENOUS
Status: DISCONTINUED | OUTPATIENT
Start: 2025-05-08 | End: 2025-05-08 | Stop reason: HOSPADM

## 2025-05-08 RX ORDER — NITROGLYCERIN 5 MG/ML
INJECTION, SOLUTION INTRAVENOUS
Status: DISCONTINUED | OUTPATIENT
Start: 2025-05-08 | End: 2025-05-08

## 2025-05-08 RX ORDER — OXYCODONE HYDROCHLORIDE 10 MG/1
10 TABLET ORAL EVERY 6 HOURS PRN
Refills: 0 | Status: DISCONTINUED | OUTPATIENT
Start: 2025-05-08 | End: 2025-05-10

## 2025-05-08 RX ORDER — CEFAZOLIN 2 G/1
2 INJECTION, POWDER, FOR SOLUTION INTRAMUSCULAR; INTRAVENOUS
Status: CANCELLED | OUTPATIENT
Start: 2025-05-08 | End: 2025-05-09

## 2025-05-08 RX ORDER — MUPIROCIN 20 MG/G
OINTMENT TOPICAL 2 TIMES DAILY
Status: CANCELLED | OUTPATIENT
Start: 2025-05-08 | End: 2025-05-13

## 2025-05-08 RX ORDER — PROPOFOL 10 MG/ML
INJECTION, EMULSION INTRAVENOUS
Status: DISPENSED
Start: 2025-05-08 | End: 2025-05-09

## 2025-05-08 RX ORDER — ROCURONIUM BROMIDE 10 MG/ML
INJECTION, SOLUTION INTRAVENOUS
Status: DISCONTINUED | OUTPATIENT
Start: 2025-05-08 | End: 2025-05-08

## 2025-05-08 RX ORDER — DEXAMETHASONE SODIUM PHOSPHATE 4 MG/ML
INJECTION, SOLUTION INTRA-ARTICULAR; INTRALESIONAL; INTRAMUSCULAR; INTRAVENOUS; SOFT TISSUE
Status: DISCONTINUED | OUTPATIENT
Start: 2025-05-08 | End: 2025-05-08

## 2025-05-08 RX ORDER — NICARDIPINE HYDROCHLORIDE 0.2 MG/ML
0-15 INJECTION INTRAVENOUS CONTINUOUS
Status: DISCONTINUED | OUTPATIENT
Start: 2025-05-08 | End: 2025-05-09

## 2025-05-08 RX ORDER — ONDANSETRON HYDROCHLORIDE 2 MG/ML
4 INJECTION, SOLUTION INTRAVENOUS
Status: COMPLETED | OUTPATIENT
Start: 2025-05-08 | End: 2025-05-08

## 2025-05-08 RX ORDER — LIDOCAINE HYDROCHLORIDE 20 MG/ML
INJECTION, SOLUTION EPIDURAL; INFILTRATION; INTRACAUDAL; PERINEURAL
Status: DISCONTINUED | OUTPATIENT
Start: 2025-05-08 | End: 2025-05-08

## 2025-05-08 RX ORDER — POLYETHYLENE GLYCOL 3350 17 G/17G
17 POWDER, FOR SOLUTION ORAL DAILY PRN
Status: DISCONTINUED | OUTPATIENT
Start: 2025-05-08 | End: 2025-05-14

## 2025-05-08 RX ORDER — MORPHINE SULFATE 2 MG/ML
6 INJECTION, SOLUTION INTRAMUSCULAR; INTRAVENOUS
Refills: 0 | Status: COMPLETED | OUTPATIENT
Start: 2025-05-08 | End: 2025-05-08

## 2025-05-08 RX ORDER — DEXMEDETOMIDINE HYDROCHLORIDE 4 UG/ML
0-1.4 INJECTION, SOLUTION INTRAVENOUS CONTINUOUS
Status: DISCONTINUED | OUTPATIENT
Start: 2025-05-08 | End: 2025-05-10

## 2025-05-08 RX ORDER — CALCIUM GLUCONATE 20 MG/ML
1 INJECTION, SOLUTION INTRAVENOUS
Status: DISCONTINUED | OUTPATIENT
Start: 2025-05-08 | End: 2025-05-12

## 2025-05-08 RX ORDER — PROPOFOL 10 MG/ML
VIAL (ML) INTRAVENOUS
Status: DISCONTINUED | OUTPATIENT
Start: 2025-05-08 | End: 2025-05-08

## 2025-05-08 RX ORDER — PROTAMINE SULFATE 10 MG/ML
INJECTION, SOLUTION INTRAVENOUS
Status: DISCONTINUED | OUTPATIENT
Start: 2025-05-08 | End: 2025-05-08

## 2025-05-08 RX ORDER — ASPIRIN 81 MG/1
81 TABLET ORAL DAILY
Status: DISCONTINUED | OUTPATIENT
Start: 2025-05-08 | End: 2025-05-14 | Stop reason: HOSPADM

## 2025-05-08 RX ORDER — MAGNESIUM SULFATE HEPTAHYDRATE 40 MG/ML
2 INJECTION, SOLUTION INTRAVENOUS
Status: DISCONTINUED | OUTPATIENT
Start: 2025-05-08 | End: 2025-05-12

## 2025-05-08 RX ORDER — FENTANYL CITRATE 50 UG/ML
INJECTION, SOLUTION INTRAMUSCULAR; INTRAVENOUS
Status: DISCONTINUED | OUTPATIENT
Start: 2025-05-08 | End: 2025-05-08

## 2025-05-08 RX ORDER — FENTANYL CITRATE 50 UG/ML
25 INJECTION, SOLUTION INTRAMUSCULAR; INTRAVENOUS
Refills: 0 | Status: DISCONTINUED | OUTPATIENT
Start: 2025-05-08 | End: 2025-05-08

## 2025-05-08 RX ORDER — POTASSIUM CHLORIDE 14.9 MG/ML
40 INJECTION INTRAVENOUS
Status: DISCONTINUED | OUTPATIENT
Start: 2025-05-08 | End: 2025-05-12

## 2025-05-08 RX ORDER — KETOROLAC TROMETHAMINE 30 MG/ML
15 INJECTION, SOLUTION INTRAMUSCULAR; INTRAVENOUS
Status: COMPLETED | OUTPATIENT
Start: 2025-05-08 | End: 2025-05-08

## 2025-05-08 RX ORDER — ACETAMINOPHEN 500 MG
1000 TABLET ORAL EVERY 8 HOURS
Status: DISCONTINUED | OUTPATIENT
Start: 2025-05-08 | End: 2025-05-10

## 2025-05-08 RX ORDER — SODIUM CHLORIDE, SODIUM LACTATE, POTASSIUM CHLORIDE, CALCIUM CHLORIDE 600; 310; 30; 20 MG/100ML; MG/100ML; MG/100ML; MG/100ML
INJECTION, SOLUTION INTRAVENOUS CONTINUOUS
Status: CANCELLED | OUTPATIENT
Start: 2025-05-08

## 2025-05-08 RX ORDER — HYDROCODONE BITARTRATE AND ACETAMINOPHEN 500; 5 MG/1; MG/1
TABLET ORAL
Status: DISCONTINUED | OUTPATIENT
Start: 2025-05-08 | End: 2025-05-13

## 2025-05-08 RX ORDER — OXYCODONE HYDROCHLORIDE 5 MG/1
5 TABLET ORAL EVERY 4 HOURS PRN
Refills: 0 | Status: DISCONTINUED | OUTPATIENT
Start: 2025-05-08 | End: 2025-05-10

## 2025-05-08 RX ORDER — CEFAZOLIN SODIUM 1 G/3ML
INJECTION, POWDER, FOR SOLUTION INTRAMUSCULAR; INTRAVENOUS
Status: DISCONTINUED | OUTPATIENT
Start: 2025-05-08 | End: 2025-05-08

## 2025-05-08 RX ORDER — POTASSIUM CHLORIDE 29.8 MG/ML
40 INJECTION INTRAVENOUS
Status: DISCONTINUED | OUTPATIENT
Start: 2025-05-08 | End: 2025-05-12

## 2025-05-08 RX ORDER — FAMOTIDINE 10 MG/ML
20 INJECTION, SOLUTION INTRAVENOUS 2 TIMES DAILY
Status: DISCONTINUED | OUTPATIENT
Start: 2025-05-08 | End: 2025-05-10

## 2025-05-08 RX ORDER — MORPHINE SULFATE 4 MG/ML
4 INJECTION, SOLUTION INTRAMUSCULAR; INTRAVENOUS
Refills: 0 | Status: COMPLETED | OUTPATIENT
Start: 2025-05-08 | End: 2025-05-08

## 2025-05-08 RX ORDER — FENTANYL CITRATE-0.9 % NACL/PF 10 MCG/ML
0-250 PLASTIC BAG, INJECTION (ML) INTRAVENOUS CONTINUOUS
Refills: 0 | Status: DISCONTINUED | OUTPATIENT
Start: 2025-05-08 | End: 2025-05-09

## 2025-05-08 RX ADMIN — CALCIUM CHLORIDE 1 G: 100 INJECTION, SOLUTION INTRAVENOUS at 10:05

## 2025-05-08 RX ADMIN — ROCURONIUM BROMIDE 30 MG: 10 INJECTION, SOLUTION INTRAVENOUS at 09:05

## 2025-05-08 RX ADMIN — PROTAMINE SULFATE 100 MG: 10 INJECTION, SOLUTION INTRAVENOUS at 11:05

## 2025-05-08 RX ADMIN — HEPARIN SODIUM 3000 UNITS: 1000 INJECTION INTRAVENOUS; SUBCUTANEOUS at 11:05

## 2025-05-08 RX ADMIN — FENTANYL CITRATE 100 MCG: 50 INJECTION INTRAMUSCULAR; INTRAVENOUS at 12:05

## 2025-05-08 RX ADMIN — NITROGLYCERIN 50 MCG: 5 INJECTION, SOLUTION INTRAVENOUS at 12:05

## 2025-05-08 RX ADMIN — Medication 25 MG: at 07:05

## 2025-05-08 RX ADMIN — ASPIRIN 81 MG: 81 TABLET, COATED ORAL at 05:05

## 2025-05-08 RX ADMIN — NITROGLYCERIN 50 MCG: 5 INJECTION, SOLUTION INTRAVENOUS at 11:05

## 2025-05-08 RX ADMIN — SODIUM CHLORIDE, SODIUM GLUCONATE, SODIUM ACETATE, POTASSIUM CHLORIDE, MAGNESIUM CHLORIDE, SODIUM PHOSPHATE, DIBASIC, AND POTASSIUM PHOSPHATE: .53; .5; .37; .037; .03; .012; .00082 INJECTION, SOLUTION INTRAVENOUS at 08:05

## 2025-05-08 RX ADMIN — FAMOTIDINE 20 MG: 10 INJECTION, SOLUTION INTRAVENOUS at 09:05

## 2025-05-08 RX ADMIN — PROPOFOL 50 MCG/KG/MIN: 10 INJECTION, EMULSION INTRAVENOUS at 06:05

## 2025-05-08 RX ADMIN — FENTANYL CITRATE 25 MCG: 50 INJECTION INTRAMUSCULAR; INTRAVENOUS at 08:05

## 2025-05-08 RX ADMIN — LIDOCAINE HYDROCHLORIDE 100 MG: 20 INJECTION, SOLUTION EPIDURAL; INFILTRATION; INTRACAUDAL; PERINEURAL at 07:05

## 2025-05-08 RX ADMIN — DEXMEDETOMIDINE HYDROCHLORIDE 0.2 MCG/KG/HR: 4 INJECTION INTRAVENOUS at 05:05

## 2025-05-08 RX ADMIN — CALCIUM CHLORIDE 0.5 G: 100 INJECTION, SOLUTION INTRAVENOUS at 11:05

## 2025-05-08 RX ADMIN — ACETAMINOPHEN 1000 MG: 500 TABLET ORAL at 09:05

## 2025-05-08 RX ADMIN — ROCURONIUM BROMIDE 50 MG: 10 INJECTION, SOLUTION INTRAVENOUS at 10:05

## 2025-05-08 RX ADMIN — ESMOLOL HYDROCHLORIDE 30 MG: 100 INJECTION, SOLUTION INTRAVENOUS at 07:05

## 2025-05-08 RX ADMIN — SODIUM CHLORIDE 1000 ML: 9 INJECTION, SOLUTION INTRAVENOUS at 02:05

## 2025-05-08 RX ADMIN — CEFAZOLIN 2 G: 330 INJECTION, POWDER, FOR SOLUTION INTRAMUSCULAR; INTRAVENOUS at 07:05

## 2025-05-08 RX ADMIN — IOHEXOL 100 ML: 350 INJECTION, SOLUTION INTRAVENOUS at 05:05

## 2025-05-08 RX ADMIN — NICARDIPINE HYDROCHLORIDE 2.5 MG/HR: 0.2 INJECTION, SOLUTION INTRAVENOUS at 06:05

## 2025-05-08 RX ADMIN — NITROGLYCERIN 50 MCG: 5 INJECTION, SOLUTION INTRAVENOUS at 01:05

## 2025-05-08 RX ADMIN — CALCIUM CHLORIDE 1 G: 100 INJECTION, SOLUTION INTRAVENOUS at 09:05

## 2025-05-08 RX ADMIN — CALCIUM CHLORIDE 0.5 G: 100 INJECTION, SOLUTION INTRAVENOUS at 09:05

## 2025-05-08 RX ADMIN — ESMOLOL HYDROCHLORIDE 20 MG: 100 INJECTION, SOLUTION INTRAVENOUS at 07:05

## 2025-05-08 RX ADMIN — ROCURONIUM BROMIDE 50 MG: 10 INJECTION, SOLUTION INTRAVENOUS at 11:05

## 2025-05-08 RX ADMIN — Medication 25 MG: at 10:05

## 2025-05-08 RX ADMIN — PROPOFOL 10 MCG/KG/MIN: 10 INJECTION, EMULSION INTRAVENOUS at 03:05

## 2025-05-08 RX ADMIN — ROCURONIUM BROMIDE 30 MG: 10 INJECTION, SOLUTION INTRAVENOUS at 08:05

## 2025-05-08 RX ADMIN — HEPARIN SODIUM 5000 UNITS: 1000 INJECTION INTRAVENOUS; SUBCUTANEOUS at 10:05

## 2025-05-08 RX ADMIN — FENTANYL CITRATE 25 MCG: 50 INJECTION INTRAMUSCULAR; INTRAVENOUS at 03:05

## 2025-05-08 RX ADMIN — ONDANSETRON 4 MG: 2 INJECTION INTRAMUSCULAR; INTRAVENOUS at 02:05

## 2025-05-08 RX ADMIN — DEXMEDETOMIDINE HYDROCHLORIDE 0.5 MCG/KG/HR: 4 INJECTION INTRAVENOUS at 08:05

## 2025-05-08 RX ADMIN — FENTANYL CITRATE 100 MCG: 50 INJECTION INTRAMUSCULAR; INTRAVENOUS at 08:05

## 2025-05-08 RX ADMIN — SODIUM CHLORIDE 1000 ML: 9 INJECTION, SOLUTION INTRAVENOUS at 04:05

## 2025-05-08 RX ADMIN — SODIUM CHLORIDE: 0.9 INJECTION, SOLUTION INTRAVENOUS at 07:05

## 2025-05-08 RX ADMIN — MORPHINE SULFATE 4 MG: 4 INJECTION INTRAVENOUS at 02:05

## 2025-05-08 RX ADMIN — Medication 25 MCG/HR: at 08:05

## 2025-05-08 RX ADMIN — ESMOLOL HYDROCHLORIDE 10 MG: 100 INJECTION, SOLUTION INTRAVENOUS at 09:05

## 2025-05-08 RX ADMIN — SODIUM CHLORIDE: 0.9 INJECTION, SOLUTION INTRAVENOUS at 08:05

## 2025-05-08 RX ADMIN — PROPOFOL 120 MG: 10 INJECTION, EMULSION INTRAVENOUS at 07:05

## 2025-05-08 RX ADMIN — FENTANYL CITRATE 100 MCG: 50 INJECTION INTRAMUSCULAR; INTRAVENOUS at 07:05

## 2025-05-08 RX ADMIN — ROCURONIUM BROMIDE 20 MG: 10 INJECTION, SOLUTION INTRAVENOUS at 12:05

## 2025-05-08 RX ADMIN — FENTANYL CITRATE 50 MCG: 50 INJECTION INTRAMUSCULAR; INTRAVENOUS at 08:05

## 2025-05-08 RX ADMIN — NITROGLYCERIN 25 MCG: 5 INJECTION, SOLUTION INTRAVENOUS at 08:05

## 2025-05-08 RX ADMIN — ESMOLOL HYDROCHLORIDE 10 MG: 100 INJECTION, SOLUTION INTRAVENOUS at 08:05

## 2025-05-08 RX ADMIN — HEPARIN SODIUM 11000 UNITS: 1000 INJECTION INTRAVENOUS; SUBCUTANEOUS at 09:05

## 2025-05-08 RX ADMIN — KETOROLAC TROMETHAMINE 15 MG: 30 INJECTION, SOLUTION INTRAMUSCULAR; INTRAVENOUS at 03:05

## 2025-05-08 RX ADMIN — MORPHINE SULFATE 6 MG: 2 INJECTION, SOLUTION INTRAMUSCULAR; INTRAVENOUS at 03:05

## 2025-05-08 RX ADMIN — ROCURONIUM BROMIDE 40 MG: 10 INJECTION, SOLUTION INTRAVENOUS at 07:05

## 2025-05-08 RX ADMIN — POTASSIUM BICARBONATE 40 MEQ: 391 TABLET, EFFERVESCENT ORAL at 04:05

## 2025-05-08 RX ADMIN — CEFAZOLIN 2 G: 330 INJECTION, POWDER, FOR SOLUTION INTRAMUSCULAR; INTRAVENOUS at 11:05

## 2025-05-08 RX ADMIN — ROCURONIUM BROMIDE 100 MG: 10 INJECTION, SOLUTION INTRAVENOUS at 07:05

## 2025-05-08 RX ADMIN — DEXAMETHASONE SODIUM PHOSPHATE 4 MG: 4 INJECTION, SOLUTION INTRAMUSCULAR; INTRAVENOUS at 07:05

## 2025-05-08 RX ADMIN — MIDAZOLAM HYDROCHLORIDE 2 MG: 2 INJECTION, SOLUTION INTRAMUSCULAR; INTRAVENOUS at 07:05

## 2025-05-08 RX ADMIN — HEPARIN SODIUM 3000 UNITS: 1000 INJECTION INTRAVENOUS; SUBCUTANEOUS at 10:05

## 2025-05-08 NOTE — TRANSFER OF CARE
"Anesthesia Transfer of Care Note    Patient: Dirk King    Procedure(s) Performed: Procedure(s) (LRB):  REPAIR-ANEURYSM-ABDOMINAL-AORTIC (AAA) class a @0619 (N/A)    Patient location: ICU    Anesthesia Type: general    Transport from OR: Transported from OR intubated on 100% O2  with adequate ventilation controlled by transport ventilator. Upon arrival to PACU/ICU, patient attached to ventilator and auscultated to confirm bilateral breath sounds and adequate TV. Continuous ECG monitoring in transport. Continuous SpO2 monitoring in transport. Continuos invasive BP monitoring in transport. Continuous CVP monitoring in transport    Post pain: adequate analgesia    Post assessment: no apparent anesthetic complications    Post vital signs: stable    Level of consciousness: sedated    Nausea/Vomiting: no nausea/vomiting    Complications: none    Transfer of care protocol was followed      Last vitals: Visit Vitals  /88 (BP Location: Right arm, Patient Position: Lying)   Pulse 87   Temp 36.7 °C (98.1 °F) (Oral)   Resp 16   Ht 5' 7" (1.702 m)   Wt 90.7 kg (200 lb)   SpO2 100%   BMI 31.32 kg/m²     "

## 2025-05-08 NOTE — ANESTHESIA PREPROCEDURE EVALUATION
[facility]  Anesthesia Pre-Operative Evaluation         Patient Name: Dirk King  YOB: 1980  MRN: [id number]    SUBJECTIVE:     Pre-operative evaluation for Procedure(s) (LRB):  REPAIR-ANEURYSM-ABDOMINAL-AORTIC (AAA) class a @0619 (N/A)     2025    Dirk King is a 44 y.o. male w/ a significant PMHx of HTN, now admitted with symptomatic AAA. Patient reports 5 days of severe back pain now radiating from abdomen to groin. Patient is alert and oriented on room air, tachycardic. Cardene discontinued. Nothing by mouth since prior to midnight. Denies prior anesthesia, reports >4 METS.    Patient now presents for the above procedure(s).      LDA:   Peripheral IV 20G R Upper Arm  Peripheral IV 18G L Upper Arm    Prev airway: None documented.    Drips:   None    Problem List[1]    Review of patient's allergies indicates:  No Known Allergies    Current Outpatient Medications:  Current Medications[2]    History reviewed. No pertinent surgical history.    Social History[3]    OBJECTIVE:     Vital Signs Range (Last 24H):  Temp:  [36.5 °C (97.7 °F)-36.8 °C (98.2 °F)]   Pulse:  []   Resp:  [15-24]   BP: (125-155)/()   SpO2:  [95 %-100 %]       Significant Labs:  Lab Results   Component Value Date    WBC 13.80 (H) 2025    HGB 15.3 2025    HCT 44.5 2025     2025    ALT 15 2025    AST 17 2025     (L) 2025    K 3.3 (L) 2025    CL 95 2025    CREATININE 1.0 2025    BUN 12 2025    CO2 24 2025    TSH 2.37 2009    INR 1.1 2025       Diagnostic Studies: No relevant studies.    EKD ECHO:  TTE:  No results found for this or any previous visit.    BRANDY:  No results found for this or any previous visit.    ASSESSMENT/PLAN:           Pre-op Assessment    I have reviewed the Patient Summary Reports.     I have reviewed the Nursing Notes. I have reviewed the NPO Status.   I  have reviewed the Medications.     Review of Systems  Anesthesia Hx:  No previous Anesthesia   Neg history of prior surgery.          Denies Family Hx of Anesthesia complications.    Denies Personal Hx of Anesthesia complications.                    Social:  Non-Smoker       Hematology/Oncology:  Hematology Normal   Oncology Normal                                   Cardiovascular:  Cardiovascular Normal                   Symptomatic AAA                           Pulmonary:  Pulmonary Normal                       Renal/:  Renal/ Normal                 Hepatic/GI:  Hepatic/GI Normal                    Musculoskeletal:  Musculoskeletal Normal                Neurological:  Neurology Normal                                      Endocrine:  Endocrine Normal            Psych:  Psychiatric Normal                    Physical Exam  General: Well nourished, Cooperative, Alert and Oriented    Airway:  Mallampati: II   Mouth Opening: Normal  TM Distance: Normal  Neck ROM: Normal ROM    Dental:  Intact    Chest/Lungs:  Normal Respiratory Rate, Clear to auscultation    Heart:  Rate: Tachycardia  Rhythm: Regular Rhythm        Anesthesia Plan  Type of Anesthesia, risks & benefits discussed:    Anesthesia Type: Gen ETT  Intra-op Monitoring Plan: Standard ASA Monitors, Art Line and Central Line  Post Op Pain Control Plan: multimodal analgesia and IV/PO Opioids PRN  Induction:  IV  Airway Plan: Direct, Post-Induction  Informed Consent: Informed consent signed with the Patient and all parties understand the risks and agree with anesthesia plan.  All questions answered.   ASA Score: 4 Emergent  Day of Surgery Review of History & Physical: H&P Update referred to the surgeon/provider.    Ready For Surgery From Anesthesia Perspective.     .           [1]   Patient Active Problem List  Diagnosis    Infrarenal abdominal aortic aneurysm (AAA) without rupture   [2]   Current Facility-Administered Medications:     0.9%  NaCl infusion (for  blood administration), , Intravenous, Q24H PRN, Lex Nevarez MD    0.9%  NaCl infusion (for blood administration), , Intravenous, Q24H PRN, Lex Nevarez MD    0.9%  NaCl infusion (for blood administration), , Intravenous, Q24H PRN, Lex Nevarez MD    niCARdipine 40 mg/200 mL (0.2 mg/mL) infusion, 0-15 mg/hr, Intravenous, Continuous, Harsha Tomlinson Jr., MD, Last Rate: 12.5 mL/hr at 05/08/25 0617, 2.5 mg/hr at 05/08/25 0617  No current outpatient medications on file.  [3]   Social History  Socioeconomic History    Marital status: Single   Tobacco Use    Smoking status: Never   Substance and Sexual Activity    Alcohol use: No    Drug use: No

## 2025-05-08 NOTE — ANESTHESIA PROCEDURE NOTES
Intubation    Date/Time: 5/8/2025 7:13 AM    Performed by: Chuy Patel DO  Authorized by: Alexander Markham MD    Intubation:     Induction:  Intravenous    Intubated:  Postinduction    Mask Ventilation:  Easy mask    Attempts:  1    Attempted By:  Resident anesthesiologist    Method of Intubation:  Video laryngoscopy    Blade:  Duarte 3    Laryngeal View Grade: Grade I - full view of cords      Difficult Airway Encountered?: No      Complications:  None    Airway Device:  Oral endotracheal tube    Airway Device Size:  7.5    Style/Cuff Inflation:  Cuffed (inflated to minimal occlusive pressure)    Placement Verified By:  Capnometry    Complicating Factors:  None    Findings Post-Intubation:  BS equal bilateral and atraumatic/condition of teeth unchanged

## 2025-05-08 NOTE — ED PROVIDER NOTES
Encounter Date: 5/8/2025       History     Chief Complaint   Patient presents with    Flank Pain     Left flank pain radiates into left groin     Pt is a 44 year old male with no pertinent PMHx who presents for evaluation of left flank pain, which began several days ago but worsened significantly tonight. Reports pain radiates to the LLQ. Associated nausea, vomiting, dysuria, and decreased urine output. No hx of similar symptoms. He has not taken any medication for pain. No fever, chills, cough, congestion, chest pain, shortness of breath, diarrhea, penile pain, or testicular pain    The history is provided by the patient.     Review of patient's allergies indicates:  No Known Allergies  History reviewed. No pertinent past medical history.  History reviewed. No pertinent surgical history.  No family history on file.  Social History[1]  Review of Systems    Physical Exam     Initial Vitals [05/08/25 0208]   BP Pulse Resp Temp SpO2   (!) 148/105 109 (!) 24 97.7 °F (36.5 °C) 100 %      MAP       --         Physical Exam    Nursing note and vitals reviewed.  Constitutional: He appears distressed.   HENT:   Head: Normocephalic and atraumatic.   Eyes: EOM are normal. Pupils are equal, round, and reactive to light.   Neck: Neck supple.   Normal range of motion.  Cardiovascular:  Normal rate, regular rhythm and intact distal pulses.           No murmur heard.  Pulmonary/Chest: Breath sounds normal. No respiratory distress. He has no wheezes. He has no rales.   Abdominal: Abdomen is soft. There is abdominal tenderness (LLQ, left flank). There is no rebound and no guarding.   Genitourinary:    Genitourinary Comments: Genital exam performed under nursing supervision without penile tenderness to palpation. No testicular tenderness to palpation or swelling     Musculoskeletal:         General: No edema. Normal range of motion.      Cervical back: Normal range of motion and neck supple.     Neurological: He is alert and oriented  to person, place, and time.   Skin: Skin is warm and dry.         ED Course   Procedures  Labs Reviewed   COMPREHENSIVE METABOLIC PANEL - Abnormal       Result Value    Sodium 133 (*)     Potassium 3.3 (*)     Chloride 95      CO2 24      Glucose 140 (*)     BUN 12      Creatinine 1.0      Calcium 10.0      Protein Total 7.8      Albumin 3.9      Bilirubin Total 2.4 (*)     ALP 99      AST 17      ALT 15      Anion Gap 14      eGFR >60     URINALYSIS, REFLEX TO URINE CULTURE - Abnormal    Color, UA Yellow      Appearance, UA Clear      pH, UA 6.0      Spec Grav UA >=1.030 (*)     Protein, UA 1+ (*)     Glucose, UA Negative      Ketones, UA 1+ (*)     Bilirubin, UA 1+ (*)     Blood, UA 1+ (*)     Nitrites, UA Negative      Urobilinogen, UA 2.0-3.0 (*)     Leukocyte Esterase, UA Negative     CBC WITH DIFFERENTIAL - Abnormal    WBC 13.80 (*)     RBC 5.11      HGB 15.3      HCT 44.5      MCV 87      MCH 29.9      MCHC 34.4      RDW 12.4      Platelet Count 442      MPV 9.3      Nucleated RBC 0      Neut % 64.6      Lymph % 24.1      Mono % 10.1      Eos % 0.4      Basophil % 0.4      Imm Grans % 0.4      Neut # 8.91 (*)     Lymph # 3.33      Mono # 1.40 (*)     Eos # 0.05      Baso # 0.05      Imm Grans # 0.06 (*)    URINALYSIS MICROSCOPIC - Abnormal    RBC, UA 5 (*)     WBC, UA 3      Bacteria, UA Rare      Squamous Epithelial Cells, UA 1      Hyaline Casts, UA 44 (*)     Microscopic Comment       HEPATITIS C ANTIBODY - Normal    Hep C Ab Interp Non-Reactive     HIV 1 / 2 ANTIBODY - Normal    HIV 1/2 Ag/Ab Non-Reactive     LIPASE - Normal    Lipase Level 15     PROTIME-INR - Normal    PT 11.8      INR 1.1     CBC W/ AUTO DIFFERENTIAL    Narrative:     The following orders were created for panel order CBC W/ AUTO DIFFERENTIAL.  Procedure                               Abnormality         Status                     ---------                               -----------         ------                     CBC with  Differential[[id number]]       Abnormal            Final result                 Please view results for these tests on the individual orders.   GREY TOP URINE HOLD    Extra Tube Hold for add-ons.     HEP C VIRUS HOLD SPECIMEN   TYPE & SCREEN    Specimen Outdate 05/11/2025 23:59      Group & Rh O POS      Indirect Debi NEG     ABORH RETYPE   ISTAT CHEM8   PREPARE RBC SOFT   PREPARE FRESH FROZEN PLASMA SOFT   PREPARE PLATELETS (DOSE) SOFT   PREPARE FRESH FROZEN PLASMA SOFT          Imaging Results               CT Abdomen Pelvis With IV Contrast NO Oral Contrast (Final result)  Result time 05/08/25 06:54:57      Final result by Erich Workman MD (05/08/25 06:54:57)                   Impression:      5.1 cm fusiform infrarenal abdominal aortic aneurysm with halo of surrounding hyperattenuating fluid concerning for aneurysm rupture.  Emergent subspecialty evaluation is advised.    Short segment dissection at the superior and inferior aspect of the aneurysm as well as a penetrating atherosclerotic ulcer or ulcer-like projection at its posteroinferior margin.  The ulcer-like projection may represent site of aneurysm rupture.    3.0 cm right common iliac artery aneurysm.    Mild hepatomegaly.    Additional findings discussed in the body of the report.    This report was flagged in Epic as abnormal.    Findings were discovered and immediately communicated by Zacarias Up MD to Harsha Tomlinson MD at 05:43 on 05/08/2025.    Electronically signed by resident: Zacarias Up  Date:    05/08/2025  Time:    05:41    Electronically signed by: Erich Workman MD  Date:    05/08/2025  Time:    06:54               Narrative:    EXAMINATION:  CT ABDOMEN PELVIS WITH IV CONTRAST    CLINICAL HISTORY:  Flank pain, kidney stone suspected    TECHNIQUE:  Axial images of the abdomen and pelvis were acquired after the use of 100 cc [id number] IV contrast. No oral contrast was administered.  Coronal and sagittal reconstructions were also  obtained    COMPARISON:  No relevant priors.    FINDINGS:  Heart: Normal in size. No pericardial effusion. No significant calcific coronary atherosclerosis.    Lungs: No focal consolidation, pleural effusion or pneumothorax.    Hepatobiliary: Liver is enlarged measuring 20.1 cm.  Normal contour.  No focal hepatic lesion.  No calcified gallstones. No pericholecystic fluid or gallbladder wall thickening.No intrahepatic or extrahepatic biliary ductal dilatation.    Spleen: Unremarkable.    Pancreas: No mass or peripancreatic fat stranding.    Adrenals: Unremarkable.    Kidneys/Ureters: Normal in size, location and enhancement. Subcentimeter cortical hypodensity in the left kidney, too small to adequately characterize.  No hydronephrosis or nephrolithiasis. No ureteral dilatation.    Bladder: Decompressed.    Reproductive organs: No significant abnormality of the prostate or seminal vesicles.    Peritoneum: High density material surrounding aorta, as described below.    Retroperitoneum: No pathologically enlarged abdominopelvic lymph nodes.    Vasculature: No significant atherosclerosis.  Fusiform aneurysm of the infrarenal abdominal aorta measuring up to 5.1 x 5.0 cm.  There is a short segment dissection at its superior margin.  Focal defect in the left posterolateral aortic wall near the inferior margin of the aneurysm compatible with a penetrating atherosclerotic ulcer or ulcer-like projection (2-96).  There is also a short segment dissection along the inferior aspect of the aneurysm.  Aneurysm is surrounded by hyperattenuating fluid concerning for blood products.  Right common iliac artery aneurysm measuring 3.0 cm.  Major abdominal aortic branch vessels are patent.  Question intermittent narrowing of the SMA could be exaggerated by motion.  Circumaortic left renal vein.  There is a short segment linear area of contrast along the anterior aspect of the IVC (axial image 98), potentially contrast within a small vein  though trace contrast extravasation is difficult to exclude.  Portal vasculature is patent.    Bowel/Mesentery: Stomach is unremarkable. Bowel is normal in caliber with no evidence of obstruction or inflammation.  Normal appendix.    Abdominal wall:  Small fat containing umbilical hernia.  Partially imaged gynecomastia.    Bones: No acute fracture or suspicious osseous lesions.                        Preliminary result by Zacarias Up MD (05/08/25 06:01:13)                   Impression:      5.1 cm fusiform infrarenal abdominal aortic aneurysm with halo of surrounding hyperattenuating fluid concerning for rupture.  Emergent subspecialty evaluation is advised.    Short segment dissection at the superior aspect of the aneurysm as well as a penetrating atherosclerotic ulcer at its inferior margin.    3.0 cm right common iliac artery aneurysm.    Mild hepatomegaly.    This report was flagged in Epic as abnormal.    Findings were discovered and immediately communicated via  by Zacarias Up MD to Harsha Tomlinson MD at 05:43 on 05/08/2025.    Electronically signed by resident: Zacarias Up  Date:    05/08/2025  Time:    05:41                 Narrative:    EXAMINATION:  CT ABDOMEN PELVIS WITH IV CONTRAST    CLINICAL HISTORY:  Flank pain, kidney stone suspected;LLQ abdominal pain;    TECHNIQUE:  Axial images of the abdomen and pelvis were acquired after the use of 100 cc [id number] IV contrast. No oral contrast was administered.  Coronal and sagittal reconstructions were also obtained    COMPARISON:  No relevant priors.    FINDINGS:  Heart: Normal in size. No pericardial effusion. No significant calcific coronary atherosclerosis.    Lungs: No focal consolidation, pleural effusion or pneumothorax.    Hepatobiliary: Liver is enlarged measuring 20.1 cm.  Normal contour.  No focal hepatic lesion.  No calcified gallstones. No pericholecystic fluid or gallbladder wall thickening.No intrahepatic or extrahepatic biliary ductal  dilatation.    Spleen: Unremarkable.    Pancreas: No mass or peripancreatic fat stranding.    Adrenals: Unremarkable.    Kidneys/Ureters: Normal in size, location and enhancement. Subcentimeter cortical hypodensity in the left kidney, too small to adequately characterize.  No hydronephrosis or nephrolithiasis. No ureteral dilatation.    Bladder: Decompressed.    Reproductive organs: No significant abnormality of the prostate or seminal vesicles.    Peritoneum: High density material surrounding aorta, as described below.    Retroperitoneum: No pathologically enlarged abdominopelvic lymph nodes.    Vasculature: No significant atherosclerosis.  Fusiform aneurysm of the infrarenal abdominal aorta measuring up to 5.1 x 5.0 cm.  There is a short segment dissection at its superior margin.  Focal defect in the left posterolateral aortic wall near the inferior margin of the aneurysm compatible with a penetrating atherosclerotic ulcer (2-96).  Aneurysm is surrounded by hyperattenuating fluid concerning for blood products.  Right common iliac artery aneurysm measuring 3.0 cm.  Major abdominal aortic branch vessels are patent.  Circumaortic left renal vein.  Portal vasculature is patent.    Bowel/Mesentery: Stomach is unremarkable. Bowel is normal in caliber with no evidence of obstruction or inflammation.  Normal appendix.    Abdominal wall:  Small fat containing umbilical hernia.    Bones: No acute fracture or suspicious osseous lesions.                                       Medications   niCARdipine 40 mg/200 mL (0.2 mg/mL) infusion (2.5 mg/hr Intravenous New Bag 5/8/25 6193)   0.9%  NaCl infusion (for blood administration) (has no administration in time range)   0.9%  NaCl infusion (for blood administration) (has no administration in time range)   0.9%  NaCl infusion (for blood administration) (has no administration in time range)   morphine injection 4 mg (4 mg Intravenous Given 5/8/25 0425)   ondansetron injection 4 mg (4  mg Intravenous Given 5/8/25 0235)   sodium chloride 0.9% bolus 1,000 mL 1,000 mL (0 mLs Intravenous Stopped 5/8/25 0406)   ketorolac injection 15 mg (15 mg Intravenous Given 5/8/25 0329)   morphine injection 6 mg (6 mg Intravenous Given 5/8/25 0329)   potassium bicarbonate disintegrating tablet 40 mEq (40 mEq Oral Given 5/8/25 0417)   sodium chloride 0.9% bolus 1,000 mL 1,000 mL (0 mLs Intravenous Stopped 5/8/25 0603)   iohexoL (OMNIPAQUE 350) injection 100 mL (100 mLs Intravenous Given 5/8/25 0528)     Medical Decision Making  Pt presents for flank pain. Ddx: renal colic, UTI, pyelonephritis, testicular torsion.  Patient with significant tenderness to palpation to the left flank and left lower quadrant.  Labs with mild leukocytosis.  No clinically significant anemia.  Mild hypokalemia noted which was repleted. UA negative for infection. CT abdomen/pelv concerning for ruptured AAA with component of aortic dissection and aortic ulcer. Vascular surgery consulted who evaluated the pt in the ED. Plan for class A to the OR for operative repair. Pt started on cardene for blood pressure control in the ED    Amount and/or Complexity of Data Reviewed  Labs: ordered.  Radiology: ordered.    Risk  Prescription drug management.  Decision regarding hospitalization.                                   Clinical Impression:  Final diagnoses:  [R10.9] Abdominal pain  [I71.33] Ruptured infrarenal abdominal aortic aneurysm (AAA) (Primary)  [I71.00] Dissection of aorta, unspecified portion of aorta          ED Disposition Condition    Admit                          [1]   Social History  Tobacco Use    Smoking status: Never   Substance Use Topics    Alcohol use: No    Drug use: No        Harsha Tomlinson Jr., MD  Resident  05/08/25 [id number]

## 2025-05-08 NOTE — CONSULTS
[facility] - Emergency Dept  Vascular Surgery  Consult Note    Inpatient consult to Vascular Surgery  Consult performed by: Lex Nevarez MD  Consult ordered by: Harsha Tomlinson Jr., MD        Subjective:     Chief Complaint/Reason for Admission: Symptomatic AAA     History of Present Illness: 44-year-old gentleman with no significant prior medical history other than hypertension, on hypertensive medications, presenting with 5 days of severe back pain, now radiating to his front abdomen down to his groin. No other medical history. No family history of connective tissue disorders. Recreational marijuana use, no cocaine use. He says his blood pressure has been relatively controlled.     Prescriptions Prior to Admission[1]    Review of patient's allergies indicates:  No Known Allergies    History reviewed. No pertinent past medical history.  History reviewed. No pertinent surgical history.  Family History    None       Tobacco Use    Smoking status: Never    Smokeless tobacco: Not on file   Substance and Sexual Activity    Alcohol use: No    Drug use: No    Sexual activity: Not on file     Review of Systems   Constitutional:  Negative for activity change and appetite change.   Gastrointestinal:  Positive for abdominal pain.   Musculoskeletal:  Positive for back pain.   All other systems reviewed and are negative.    Objective:     Vital Signs (Most Recent):  Temp: 98.1 °F (36.7 °C) (05/08/25 0608)  Pulse: 108 (05/08/25 0611)  Resp: 17 (05/08/25 0611)  BP: 130/85 (05/08/25 0616)  SpO2: 95 % (05/08/25 0611) Vital Signs (24h Range):  Temp:  [97.7 °F (36.5 °C)-98.2 °F (36.8 °C)] 98.1 °F (36.7 °C)  Pulse:  [] 108  Resp:  [15-24] 17  SpO2:  [95 %-100 %] 95 %  BP: (125-155)/() 130/85     Weight: 90.7 kg (200 lb)  Body mass index is 31.32 kg/m².      Physical Exam  HENT:      Head: Normocephalic.   Eyes:      Pupils: Pupils are equal, round, and reactive to light.   Cardiovascular:      Rate and Rhythm: Normal rate.       Comments: Palpable femoral pulses bilaterally   Pulmonary:      Effort: Pulmonary effort is normal.   Neurological:      Mental Status: He is alert.          Significant Labs:  All pertinent labs from the last 24 hours have been reviewed.    Significant Diagnostics:  I have reviewed and interpreted all pertinent imaging results/findings within the past 24 hours.    Assessment/Plan:     Infrarenal abdominal aortic aneurysm (AAA) without rupture  44-year-old gentleman presenting with 5 days of severe back pain, imaging concerning for symptomatic AAA due to connective tissue disorder.     - Class A OR for open repair of aneurysm.   - Path to be sent off         Thank you for your consult.     Lex Nevarez MD  Vascular Surgery  [facility] - Emergency Dept       [1] (Not in a hospital admission)

## 2025-05-08 NOTE — ASSESSMENT & PLAN NOTE
Neuro/Psych:     - Sedation: propofol, precedex    - Pain:     - Scheduled Tylenol 1000mg Q8H   - PRN Oxycodone 5mg/10mg, fentanyl for breakthrough pain     - Psych: intubated/sedated. Will perform neuro exam after propofol has been weaned             Cardiac:     - S/p AAA open repair (16x8 bifurcated dacron graft) with Dr. Rhodes.     - BP Goal: -160    - Inotropes/Pressors: none    - Cardene drip    - Rhythm: NSR    - Will trend lactic acid      Pulmonary:     - intubated and sedated. Will wean ventilator support as tolerated to extubate    - Goal SpO2 >92%    - ABGs Q2H x 6H    - ABGS PRN    - CXR ordered      Renal:    - Trend BUN/Cr     - Maintain [place], record strict Is/Os    Recent Labs   Lab 05/08/25  0240   BUN 12   CREATININE 1.0         FEN / GI:     - Daily CMP, PRN K/Mag/Phos per protocol     - Replace electrolytes as needed    - Nutrition: NPO pending extubation    - Bowel Regimen: Miralax, docusate      ID:     - Aebrile    Recent Labs   Lab 05/08/25  0240 05/08/25  1207   WBC 13.80* 20.64*         Heme/Onc:     - Hgb 15.3 pre-operatively    - CBC Q8H     - Will hold pharmacologic DVT prophylaxis for now until hgb proves to be stable    Recent Labs   Lab 05/08/25  0240 05/08/25  0553 05/08/25  1207   HGB 15.3  --  11.3*     --  147*   APTT  --   --  27.1   INR  --  1.1 1.3*         Endocrine:     - CTS Goal -140    - Insulin drip      PPx:   Feeding: NPO, will advance as tolerated following extubation   Analgesia/Sedation: Tylenol 1000mg Q8H  Thromboembolic Prevention: SCDs until Hgb is stable  HOB >30: Yes  Stress Ulcer: not indicated  Glucose Control: Yes, insulin drip     Lines/Drains/Airway:   ETT    Art Line: right radial    Central Line: right IJ trialysis   Alivia          Dispo/Code Status/Palliative:     - Continue SICU Care    - Full Code

## 2025-05-08 NOTE — EICU
"Virtual ICU Admission    Admit Date: 2025  LOS: 0  Code Status: No Order   : 1980  Bed: 15540/89172 A:     Diagnosis: Ruptured infrarenal abdominal aortic aneurysm (AAA)    Patient  has no past medical history on file.    Last VS: /88 (BP Location: Right arm, Patient Position: Lying)   Pulse 87   Temp 98.1 °F (36.7 °C) (Oral)   Resp 16   Ht 5' 7" (1.702 m)   Wt 90.7 kg (200 lb)   SpO2 100%   BMI 31.32 kg/m²       VICU Review    VICU nurse assessment :  [place] completed, LDA documentation reconciliation completed, and Skin care/wounds LDA reconciliation    Skin assessed with bedside team               "

## 2025-05-08 NOTE — SUBJECTIVE & OBJECTIVE
Prescriptions Prior to Admission[1]    Review of patient's allergies indicates:  No Known Allergies    History reviewed. No pertinent past medical history.  History reviewed. No pertinent surgical history.  Family History    None       Tobacco Use    Smoking status: Never    Smokeless tobacco: Not on file   Substance and Sexual Activity    Alcohol use: No    Drug use: No    Sexual activity: Not on file     Review of Systems   Constitutional:  Negative for activity change and appetite change.   Gastrointestinal:  Positive for abdominal pain.   Musculoskeletal:  Positive for back pain.   All other systems reviewed and are negative.    Objective:     Vital Signs (Most Recent):  Temp: 98.1 °F (36.7 °C) (05/08/25 0608)  Pulse: 108 (05/08/25 0611)  Resp: 17 (05/08/25 0611)  BP: 130/85 (05/08/25 0616)  SpO2: 95 % (05/08/25 0611) Vital Signs (24h Range):  Temp:  [97.7 °F (36.5 °C)-98.2 °F (36.8 °C)] 98.1 °F (36.7 °C)  Pulse:  [] 108  Resp:  [15-24] 17  SpO2:  [95 %-100 %] 95 %  BP: (125-155)/() 130/85     Weight: 90.7 kg (200 lb)  Body mass index is 31.32 kg/m².      Physical Exam  HENT:      Head: Normocephalic.   Eyes:      Pupils: Pupils are equal, round, and reactive to light.   Cardiovascular:      Rate and Rhythm: Normal rate.      Comments: Palpable femoral pulses bilaterally   Pulmonary:      Effort: Pulmonary effort is normal.   Neurological:      Mental Status: He is alert.          Significant Labs:  All pertinent labs from the last 24 hours have been reviewed.    Significant Diagnostics:  I have reviewed and interpreted all pertinent imaging results/findings within the past 24 hours.       [1] (Not in a hospital admission)

## 2025-05-08 NOTE — ASSESSMENT & PLAN NOTE
44-year-old gentleman presenting with 5 days of severe back pain, imaging concerning for symptomatic AAA due to connective tissue disorder.     - Class A OR for open repair of aneurysm.   - Path to be sent off

## 2025-05-08 NOTE — OP NOTE
"OPERATIVE REPORT    Patient: Dirk King  Surgery Date: 05/08/2025    Surgeons and Role:     * JESSICA Rhodes III, MD - Primary     * Lex Nevarez MD - Fellow     * Hiren Cardona MD - Fellow     * Freddy Rodriguez MD - 1st assist     A 2nd attending surgeon was required for the critical portions of this case despite having a trainee involved, because of the complexity the of this operation     Assisting Surgeon: None     Pre-op Diagnosis: Ruptured AAA, probable connective dissue disorder     Post-op Diagnosis:   same, + liver lesion, likely hemangioma     PROCEDURE:   Urgent Open repair of ruptured AAA with 16x8 bifurcated dacron graft  2.   Aortic biopsy     CODING NOTE: -22 Modifier appropriate given the increased complexity and length of this case      Anesthesia: General     Implants:  Implant Name Type Inv. Item Serial No.  Lot No. LRB No. Used Action   FELT KVNG [id number] - [id number]   FELT KVNG [id number]   [employer] [id number]   1 Implanted   INTERGARD KNITTED 16X8 mm     [id number] INTERVASCULAR [id number]   1 Implanted         Operative Findings: Clear rupture with heme in the retro-P     Infra-renal crossclamp; Extensive bleeding from an existing aortic dissection, intercostals and the R hypogastric;      End-to-end proximal anastamosis  R limb sewn end-to end to the R external iliac, R hypo ligated/oversewn  L limb sewn to the ostial R common iliac (15mm) using a "pantallon" spatulation of the limb     Aortic wall specimen sent to pathology for connective tissue evaluation     Estimated Blood Loss: 13909 mL            Specimens:   Specimen (24h ago, onward)          Start     Ordered     05/08/25 1228   Specimen to Pathology Other (vascular surgery)  RELEASE UPON ORDERING        References:    Click here for ordering Quick Tip   Question:  Release to patient  Answer:  Immediate    05/08/25 1228                       ID Type Source Tests Collected by Time Destination   1 : Rule out " connective tissue disorder Tissue Aorta SPECIMEN TO PATHOLOGY JESSICA Rhodes III, MD 5/8/2025 3360         Complications: None    Indications for Procedure:  Dirk King is a 44 y.o. male who presented with a symptomatic, ruptured infrarenal abdominal aortic aneurysms.  He was offered an urgent open repair of his aneurysm. All risks, benefits, and alternatives were discussed and the patient understood and wished to proceed and gave written consent.     Operative Details:   The patient was brought to the operating room and placed in the operating room table in supine position.  He underwent general anesthesia.  Preoperative IVs, central line, Bunch, A-line were placed the abdomen and groins were prepped and draped in the usual sterile fashion.  Perioperative antibiotics were administered.  A time-out was performed.    A midline incision was made from the right of the xiphoid process, down to the pubic tubercle, curving around the right umbilicus.  Was carried down through the subcutaneous tissue.  The fascia was encountered.  At the superior aspect of the incision, the fascia was opened.  The peritoneum was then entered sharply.  The rest of the incision was then opened.    Upon initial immediate exploration, we 1st noticed a quite large, approximately 5-6 cm liver lesion next to the gallbladder fossa.  There did not appear to be any other masses within the belly.  The colon was then retracted cephalad.  The small bowel was retracted to the right.  A bowel for retractor was placed in the belly to assist with wall retraction.    We were able to immediately identified the retroperitoneum, and here it was clear that there was rupture of his aneurysms with heme in the retroperitoneum.  There was no active bleeding through the retroperitoneum.  We began by incising the retroperitoneum next to the duodenum, and extended this dissection upwards, without entering the bleeding space.  An Omni retractor was used to  assist for retraction.  Small venous branches, lymphatics, were tied and clipped.  Proximally, we are able to identify the left renal vein, as well as the left renal artery.  Here, the aorta was not aneurysmal.  We are able to dissected down on either side of the aorta, down to the spine.  Of note, we did not note that he had a circumaortic renal vein, at this level, we were deemed to be positioned cranial to it.    Next, we made a separate incision along the left iliac artery.  The left iliac artery was noted to be quite large, about 15 mm in size.  This was dissected on either side, the left iliac vein noted to be adjacent to the artery.    To gain control of the right side, given the significant right common iliac aneurysm, we planned to sew our anastomosis to the right external iliac artery.  The right external iliac artery was circumferentially dissected free, controlled with an umbilical tape.  We continued the dissection more proximally, to the bifurcation of the common and hypogastric.  We took care to identify the ureter crossing over the iliac limb, which was visualized throughout the case.  The hypogastric was circumferentially dissected free, given that we planned to sew to the external iliac artery, we plan to ligate the hypogastric, and the hypogastric was doubly ligated with 2 0 silk ties.    Now that we had control, the patient was then systemically heparinized with IV heparin to achieve an ACT greater than 250 throughout the case.  The aneurysm sac was then entered with Bovie electrocautery, extended with scissors.  There was significant extensive bleeding present from an existing aortic dissection, and we could see evidence of the posterior left lateral disruption and rupture of the aorta.  The lumbars were oversewn initially with 2-0 silks, however we did note that the tissue was quite poor in quality, consistent with his suspected etiology of a connective tissue disorder, so we transitioned to  3-0 Prolenes with pledgets, which did not tear the tissue as significantly.  The NICOLETTE was also identified and bleeding, it had pulsatile inflow, and was ligated with a silk suture.  Distally, the right hypogastric was oversewn from the inside with additional 2-0 silks.     Once the bleeding was somewhat controlled, we brought up a 16 x 8 mm bifurcated Dacron graft onto the field.  The main body was shortened.  The proximal aorta was T'd off in the infrarenal position.  The Dacron graft was then sewn onto the proximal anastomosis with a 3-0 Prolene with the assistance of a felt strip.  This was started with a U-stitch, and run on either side, to me on top.  An additional felt strip was placed.  Following completion, the graft was clamped, and the proximal clamp partially released.  The anastomosis appeared hemostatic.  There was some bleeding at the posterior aspect of the graft, for which an additional 3-0 Prolene U-stitch with a felt pledget was placed for reinforcement of the posterior graft to the periaortic tissue.  There was an additional lumbar present at this posterior aspect, which was oversewn in a similar fashion, with significantly improved hemostasis.    Next, we turned our attention to the right anastomosis.  To allow for closure of the aneurysm sac and retroperitoneum over the right iliac limb, we tunneled our graft limb below the ureter.  The external iliac artery was then transected.  The graft was trimmed to size to meet the iliac artery. We sewed the anastomosis with a 4-0 Prolene in a running fashion starting at the posterior aspect.  With the initial bite, we did note that the artery here even appeared to be poor and tissue quality, so we used a reinforced felt strip for the anastomosis.  Prior to completion, the artery was back bled from the iliac, forward bled from the graft.  The repair was then completed.  The repair was hemostatic, no additional sutures were required.     Following this, we  turned our attention to the left anastomosis.  Given that the left common iliac was also ectatic in size, we would have to do a significant spatulation of the iliac limb.  The left common iliac was then T'd off near the origin.  The left limb was then spatulated in pantaloon fashion fashion anteriorly and posteriorly to allow for the size match up to the large iliac.  Given the ectatic size, we also elected to use a reinforced felt strip for the repair.  A 4-0 Prolene was used, starting of the posterior aspect of the anastomosis, and run along either side.  Prior to completion, the artery was back bled, forward bled.  The repair was then completed.  There was some laxity in the left iliac limb, however no kinking present. No repair sutures were required.    There was a strong pulse present in the bilateral iliac arteries.  The heparin was then reversed with protamine.  Manual pressure was held for 5 minutes, and the wound inspected.  Hemostasis was carefully achieved.  The retroperitoneum and aneurysm sac were then reapproximated over the graft over the bilateral iliacs, to the proximal aorta to cover the anastomosis.  A portion of the aortic tissue was sent off for pathology to rule out connective tissue disorder.     We did consult Surgical Oncology intraoperatively for evaluation of the liver mass, who deemed that it would be appropriate for outpatient follow-up once additional specialized imaging was performed for evaluation.     The fascia was then closed with #1 Looped PDS.  The wound was then irrigated, closed with skin staples.  Dry sterile dressings were applied.     The patient remained intubated, and brought up to the ICU in stable condition.     Dr. Rhodes was present and scrubbed for all aspects of the case.    All instrument and sponge counts were confirmed correct. The family was notified of the results of the surgery afterwards.

## 2025-05-08 NOTE — BRIEF OP NOTE
"[facility] - Surgery (2nd Fl)  Brief Operative Note    SUMMARY     Surgery Date: 5/8/2025     Surgeons and Role:     * JESSICA Rhodes III, MD - Primary     * Lex Nevarez MD - Fellow     * Hiren Cardona MD - Fellow     * Freddy Rodriguez MD - 1st assist    A 2nd attending surgeon was required for the critical portions of this case despite having a trainee involved, because of the complexity the of this operation    Assisting Surgeon: None    Pre-op Diagnosis: Ruptured AAA, probable connective dissue disorder    Post-op Diagnosis:   same, + liver lesion, likely hemangioma    PROCEDURE:   Urgent Open repair of ruptured AAA with 16x8 bifurcated dacron graft  2.   Aortic biopsy    CODING NOTE: -22 Modifier appropriate given the increased complexity and length of this case     Anesthesia: General    Implants:  Implant Name Type Inv. Item Serial No.  Lot No. LRB No. Used Action   FELT KVNG [id number] - [id number]  FELT KVNG [id number]  [employer] [id number]  1 Implanted   INTERGARD KNITTED 16X8 mm   [id number] INTERVASCULAR [id number]  1 Implanted       Operative Findings: Clear rupture with heme in the retro-P    Infra-renal crossclamp; Extensive bleeding from an existing aortic dissection, intercostals and the R hypogastric;     End-to-end proximal anastamosis  R limb sewn end-to end to the R external iliac, R hypo ligated/oversewn  L limb sewn to the ostial R common iliac (15mm) using a "pantallon" spatulation of the limb    Aortic wall specimen sent to pathology for connective tissue evaluation    Estimated Blood Loss: 68207 mL           Specimens:   Specimen (24h ago, onward)       Start     Ordered    05/08/25 1228  Specimen to Pathology Other (vascular surgery)  RELEASE UPON ORDERING        References:    Click here for ordering Quick Tip   Question:  Release to patient  Answer:  Immediate    05/08/25 1228                  ID Type Source Tests Collected by Time Destination   1 : Rule out connective tissue " disorder Tissue Aorta SPECIMEN TO PATHOLOGY JESSICA Rhodes III, MD 5/8/2025 1220        [id number]

## 2025-05-08 NOTE — HPI
Dirk King is a 44 y.o. male with a PMHx including HTN. He presents to the SICU s/p AAA open repair (16x8 bifurcated dacron graft) with Dr. Rhodes. On admission, he is intubated, sedated with propofol, and in stable condition. Antihypertensive requirements upon admission are cardene 0.2 mg/mL to maintain -160. He was also on an insulin drip 1U/hr. Central access includes a right IJ trialysis catheter and arterial access includes a right radial arterial line.    Intraoperatively, he received 6L of crystalloid, 7L of cell saver, 4 PRBCs, 10 FFP, 2 platelets, and 2 cryoprecipitate. Urine output intraoperatively was recorded as 1.2L.    Immediate post-operative plans include hemodynamic stabilization and weaning of respiratory support. Plan to wean ventilator support with goal of early extubation, and closely monitor fluid status with strict Is/Os and continued fluid resuscitation as needed.

## 2025-05-08 NOTE — PROGRESS NOTES
Autotransfusion/Rapid Infusion Record:      05/08/2025  Autotransfusionist:  Kj Rhodes    Surgeons and Role:     * JESSICA Rhodes III, MD - Primary     * Lex Nevarez MD - Fellow     * Hiren Cardona MD - Fellow     * Freddy Rodriguez MD - Co-Surgeon  Anesthesiologist:  Alexander Markham MD    History reviewed. No pertinent past medical history.    Procedure(s) (LRB):  REPAIR-ANEURYSM-ABDOMINAL-AORTIC (AAA) class a @0619 (N/A)     1:30 PM    Equipment:    Cell Saver     R.I.S.  : [employer] Model: CATSmart or CATSplus : [employer]   Model: [id number]     Serial number: [id number]   Serial number:    Disposable lot #: [id number]   Disposable lot #:      Were extra cardiotomies used for cell saver?  Yes   if yes, #:  Total: 2    Solutions:  Anticoagulant: ACD-A   Expiration date: 2026/09 Volume used: 2.4 L   Wash solution: 0.9% NaCl   Expiration date: 2027/01 Volume used: 7.5 L     Cell saver checklist  Time completed:           [x]   Circuit assembled correctly     [x]   Cell saver powered and operational     [x]   Vacuum connected, functional, adjust to max -150mmHg     [x]   Anticoagulant drip rate adjusted     [x]   Transfer bag properly labeled with patient name, expiration time, volume,       anticoagulant, OR number, and initials     [x]   Cell saver disinfected after use (completed at end of case)       Cell Saver volumes:    Total volume processed:     52159 mL     Total volume pRBCs recovered      7000 mL     Volume pRBCs infused     7000 mL         RIS checklist   Time completed:  [x]   RIS circuit assembled correctly     [x]   RIS power and operational     [x]   RIS disinfected after use (completed at end of case)       RIS volumes:    Total volume infused:    (see anesthesia record for blood       product information)   16658 mL       Additional comments:

## 2025-05-08 NOTE — PLAN OF CARE
SICU PLAN OF CARE    Dx: Ruptured infrarenal abdominal aortic aneurysm (AAA)    Goals of Care: -160, keep sedated and intubated for the shift. Possibly extubate tomorrow.     Vital Signs (last 12 hours):   Temp:  [98 °F (36.7 °C)-98.3 °F (36.8 °C)]   Pulse:  []   Resp:  [0-21]   BP: (129-139)/(80-88)   SpO2:  [94 %-100 %]   Arterial Line BP: (102-151)/(71-90)      Neuro: Follows commands , Arouses to Voice, Moves all extremities spontaneously , Intubated , and Sedated    Cardiac:  Sinus rhythm - sinus tach      Respiratory: Ventilator; Mode: A/C, 40% FiO2, 5 PEEP    Gtts: Propofol and Precedex    Urine Output: Urethral Catheter  425 mL/shift      Diet: NPO     Restraints Q2     Labs/Accuchecks: Labs Q8 CBC, Q12 CMP    Skin:  Incision on chest, no obvious bleeding.  Patient turned q2h, bony prominences protected, and mattress inflated/working correctly.     Shift Events:  Up from OR, patient lost approx. 12,000 ml of blood. Patient did receive large amount of product. Patient to remain sedated and intubated throughout shift, does follow commands when sedation is paused.  See flowsheet for further assessment/details.  Family updated on current condition/plan of care, questions answered, and emotional support provided.  MD updated on current condition, vitals, labs, and gtts.

## 2025-05-08 NOTE — SUBJECTIVE & OBJECTIVE
Follow-up For: Procedure(s) (LRB):  REPAIR-ANEURYSM-ABDOMINAL-AORTIC (AAA) class a @0619 (N/A)    Post-Operative Day: * Day of Surgery *     History reviewed. No pertinent past medical history.    History reviewed. No pertinent surgical history.    Review of patient's allergies indicates:  No Known Allergies    Family History    None       Tobacco Use    Smoking status: Never    Smokeless tobacco: Not on file   Substance and Sexual Activity    Alcohol use: No    Drug use: No    Sexual activity: Not on file        Objective:     Vital Signs (Most Recent):  Temp: 98.1 °F (36.7 °C) (05/08/25 0608)  Pulse: 87 (05/08/25 1400)  Resp: 16 (05/08/25 0700)  BP: 129/88 (05/08/25 0700)  SpO2: 100 % (05/08/25 1400) Vital Signs (24h Range):  Temp:  [97.7 °F (36.5 °C)-98.2 °F (36.8 °C)] 98.1 °F (36.7 °C)  Pulse:  [] 87  Resp:  [15-24] 16  SpO2:  [94 %-100 %] 100 %  BP: (125-155)/() 129/88  Arterial Line BP: (151)/(87) 151/87     Weight: 90.7 kg (200 lb)  Body mass index is 31.32 kg/m².      Intake/Output Summary (Last 24 hours) at 5/8/2025 1418  Last data filed at 5/8/2025 1400  Gross per 24 hour   Intake 54899.65 ml   Output 51648 ml   Net 2747.65 ml          Physical Exam  Constitutional:       Comments: Intubated and sedated   HENT:      Head: Normocephalic and atraumatic.      Comments: OG tube in place     Nose: Nose normal.   Neck:      Comments: Trialysis catheter in right IJ.  Cardiovascular:      Rate and Rhythm: Normal rate and regular rhythm.      Pulses: Normal pulses.      Comments: 1+ DP pulses bilaterally  Right radial arterial line in place  Pulmonary:      Comments: Ventilated  Abdominal:      Comments: Midline laparotomy incision bandaged, clean/dry   Genitourinary:     Comments: Bunch catheter in place  Skin:     General: Skin is warm and dry.   Neurological:      Comments: Intubated and sedated            Vents:  Vent Mode: A/C (05/08/25 1358)  Ventilator Initiated: Yes (05/08/25 1358)  Set Rate: 20  BPM (05/08/25 1358)  Vt Set: 430 mL (05/08/25 1358)  PEEP/CPAP: 5 cmH20 (05/08/25 1358)  Oxygen Concentration (%): 100 (05/08/25 1400)  Peak Airway Pressure: 15 cmH20 (05/08/25 1358)  Plateau Pressure: 0 cmH20 (05/08/25 1358)  Total Ve: 8.65 L/m (05/08/25 1358)  Negative Inspiratory Force (cm H2O): 0 (05/08/25 1358)    Lines/Drains/Airways       Central Venous Catheter Line  Duration                  Hemodialysis Catheter 05/08/25 0822 right internal jugular <1 day              Drain  Duration                  NG/OG Tube 05/08/25 1413 Center mouth <1 day         Urethral Catheter 05/08/25 0717 Straight-tip;Non-latex 16 Fr. <1 day              Airway  Duration                  Airway - Non-Surgical 05/08/25 0713 <1 day              Arterial Line  Duration             Arterial Line 05/08/25 0818 Right Radial <1 day              Peripheral Intravenous Line  Duration                  Peripheral IV - Single Lumen 05/08/25 0231 20 G 1 in Anterior;Right Upper Arm <1 day         Peripheral IV - Single Lumen 05/08/25 0601 18 G 1 1/4 in Anterior;Left Upper Arm <1 day         Peripheral IV - Single Lumen 05/08/25 0712 14 G  1 1/4 in Right Forearm <1 day         Peripheral IV - Single Lumen 05/08/25 0735 14 G  1 1/4 in Left Forearm <1 day                    Significant Labs:    CBC/Anemia Profile:  Recent Labs   Lab 05/08/25  0240 05/08/25  0246 05/08/25  1207   WBC 13.80*  --  20.64*   HGB 15.3  --  11.3*   HCT 44.5 49 34.3*     --  147*   MCV 87  --  89   RDW 12.4  --  13.6        Chemistries:  Recent Labs   Lab 05/08/25  0240   *   K 3.3*   CL 95   CO2 24   BUN 12   CREATININE 1.0   CALCIUM 10.0   ALBUMIN 3.9   PROT 7.8   BILITOT 2.4*   ALKPHOS 99   ALT 15   AST 17       All pertinent labs within the past 24 hours have been reviewed.    Significant Imaging: I have reviewed all pertinent imaging results/findings within the past 24 hours.

## 2025-05-08 NOTE — H&P
[facility] - [facility] Intensive Care  Critical Care - Surgery  History & Physical    Patient Name: Dirk King  MRN: [id number]  Admission Date: 5/8/2025  Code Status: No Order  Attending Physician: No att. providers found   Primary Care Provider: No, Primary Doctor   Principal Problem: Ruptured infrarenal abdominal aortic aneurysm (AAA)    Subjective:     HPI:  Dirk King is a 44 y.o. male with a PMHx including HTN. He presents to the SICU s/p AAA open repair (16x8 bifurcated dacron graft) with Dr. Rhodes. On admission, he is intubated, sedated with propofol, and in stable condition. Antihypertensive requirements upon admission are cardene 0.2 mg/mL to maintain -160. He was also on an insulin drip 1U/hr. Central access includes a right IJ trialysis catheter and arterial access includes a right radial arterial line.    Intraoperatively, he received 6L of crystalloid, 7L of cell saver, 4 PRBCs, 10 FFP, 2 platelets, and 2 cryoprecipitate. Urine output intraoperatively was recorded as 1.2L.    Immediate post-operative plans include hemodynamic stabilization and weaning of respiratory support. Plan to wean ventilator support with goal of early extubation, and closely monitor fluid status with strict Is/Os and continued fluid resuscitation as needed.       Hospital/ICU Course:  No notes on file    Follow-up For: Procedure(s) (LRB):  REPAIR-ANEURYSM-ABDOMINAL-AORTIC (AAA) class a @0619 (N/A)    Post-Operative Day: * Day of Surgery *     History reviewed. No pertinent past medical history.    History reviewed. No pertinent surgical history.    Review of patient's allergies indicates:  No Known Allergies    Family History    None       Tobacco Use    Smoking status: Never    Smokeless tobacco: Not on file   Substance and Sexual Activity    Alcohol use: No    Drug use: No    Sexual activity: Not on file        Objective:     Vital Signs (Most Recent):  Temp: 98.1 °F (36.7 °C) (05/08/25 0608)  Pulse:  87 (05/08/25 1400)  Resp: 16 (05/08/25 0700)  BP: 129/88 (05/08/25 0700)  SpO2: 100 % (05/08/25 1400) Vital Signs (24h Range):  Temp:  [97.7 °F (36.5 °C)-98.2 °F (36.8 °C)] 98.1 °F (36.7 °C)  Pulse:  [] 87  Resp:  [15-24] 16  SpO2:  [94 %-100 %] 100 %  BP: (125-155)/() 129/88  Arterial Line BP: (151)/(87) 151/87     Weight: 90.7 kg (200 lb)  Body mass index is 31.32 kg/m².      Intake/Output Summary (Last 24 hours) at 5/8/2025 1418  Last data filed at 5/8/2025 1400  Gross per 24 hour   Intake 89466.65 ml   Output 99967 ml   Net 2747.65 ml          Physical Exam  Constitutional:       Comments: Intubated and sedated   HENT:      Head: Normocephalic and atraumatic.      Comments: OG tube in place     Nose: Nose normal.   Neck:      Comments: Trialysis catheter in right IJ.  Cardiovascular:      Rate and Rhythm: Normal rate and regular rhythm.      Pulses: Normal pulses.      Comments: 1+ DP pulses bilaterally  Right radial arterial line in place  Pulmonary:      Comments: Ventilated  Abdominal:      Comments: Midline laparotomy incision bandaged, clean/dry   Genitourinary:     Comments: Bunch catheter in place  Skin:     General: Skin is warm and dry.   Neurological:      Comments: Intubated and sedated            Vents:  Vent Mode: A/C (05/08/25 1358)  Ventilator Initiated: Yes (05/08/25 1358)  Set Rate: 20 BPM (05/08/25 1358)  Vt Set: 430 mL (05/08/25 1358)  PEEP/CPAP: 5 cmH20 (05/08/25 1358)  Oxygen Concentration (%): 100 (05/08/25 1400)  Peak Airway Pressure: 15 cmH20 (05/08/25 1358)  Plateau Pressure: 0 cmH20 (05/08/25 1358)  Total Ve: 8.65 L/m (05/08/25 1358)  Negative Inspiratory Force (cm H2O): 0 (05/08/25 1358)    Lines/Drains/Airways       Central Venous Catheter Line  Duration                  Hemodialysis Catheter 05/08/25 0822 right internal jugular <1 day              Drain  Duration                  NG/OG Tube 05/08/25 1413 Center mouth <1 day         Urethral Catheter 05/08/25 0717  Straight-tip;Non-latex 16 Fr. <1 day              Airway  Duration                  Airway - Non-Surgical 05/08/25 0713 <1 day              Arterial Line  Duration             Arterial Line 05/08/25 0818 Right Radial <1 day              Peripheral Intravenous Line  Duration                  Peripheral IV - Single Lumen 05/08/25 0231 20 G 1 in Anterior;Right Upper Arm <1 day         Peripheral IV - Single Lumen 05/08/25 0601 18 G 1 1/4 in Anterior;Left Upper Arm <1 day         Peripheral IV - Single Lumen 05/08/25 0712 14 G  1 1/4 in Right Forearm <1 day         Peripheral IV - Single Lumen 05/08/25 0735 14 G  1 1/4 in Left Forearm <1 day                    Significant Labs:    CBC/Anemia Profile:  Recent Labs   Lab 05/08/25  0240 05/08/25  0246 05/08/25  1207   WBC 13.80*  --  20.64*   HGB 15.3  --  11.3*   HCT 44.5 49 34.3*     --  147*   MCV 87  --  89   RDW 12.4  --  13.6        Chemistries:  Recent Labs   Lab 05/08/25  0240   *   K 3.3*   CL 95   CO2 24   BUN 12   CREATININE 1.0   CALCIUM 10.0   ALBUMIN 3.9   PROT 7.8   BILITOT 2.4*   ALKPHOS 99   ALT 15   AST 17       All pertinent labs within the past 24 hours have been reviewed.    Significant Imaging: I have reviewed all pertinent imaging results/findings within the past 24 hours.  Assessment/Plan:     Cardiac/Vascular  * Ruptured infrarenal abdominal aortic aneurysm (AAA)    Neuro/Psych:     - Sedation: propofol, precedex    - Pain:     - Scheduled Tylenol 1000mg Q8H   - PRN Oxycodone 5mg/10mg, fentanyl for breakthrough pain     - Psych: intubated/sedated. Will perform neuro exam after propofol has been weaned             Cardiac:     - S/p AAA open repair (16x8 bifurcated dacron graft) with Dr. Rhodes.     - BP Goal: -160    - Inotropes/Pressors: none    - Cardene drip    - Rhythm: NSR    - Will trend lactic acid      Pulmonary:     - intubated and sedated. Will wean ventilator support as tolerated to extubate    - Goal SpO2 >92%     - ABGs Q2H x 6H    - ABGS PRN    - CXR ordered      Renal:    - Trend BUN/Cr     - Maintain Bunch, record strict Is/Os    Recent Labs   Lab 05/08/25  0240   BUN 12   CREATININE 1.0         FEN / GI:     - Daily CMP, PRN K/Mag/Phos per protocol     - Replace electrolytes as needed    - Nutrition: NPO pending extubation    - Bowel Regimen: Miralax, docusate      ID:     - Aebrile    Recent Labs   Lab 05/08/25  0240 05/08/25  1207   WBC 13.80* 20.64*         Heme/Onc:     - Hgb 15.3 pre-operatively    - CBC Q8H     - Will hold pharmacologic DVT prophylaxis for now until hgb proves to be stable    Recent Labs   Lab 05/08/25  0240 05/08/25  0553 05/08/25  1207   HGB 15.3  --  11.3*     --  147*   APTT  --   --  27.1   INR  --  1.1 1.3*         Endocrine:     - CTS Goal -140    - Insulin drip      PPx:   Feeding: NPO, will advance as tolerated following extubation   Analgesia/Sedation: Tylenol 1000mg Q8H  Thromboembolic Prevention: SCDs until Hgb is stable  HOB >30: Yes  Stress Ulcer: not indicated  Glucose Control: Yes, insulin drip     Lines/Drains/Airway:   ETT    Art Line: right radial    Central Line: right IJ trialysis   Bunch          Dispo/Code Status/Palliative:     - Continue SICU Care    - Full Code            Pete Varela MD  Critical Care  [facility] - Surgical Intensive Care

## 2025-05-08 NOTE — ANESTHESIA PROCEDURE NOTES
Right IJ Trialysis Catheter    Diagnosis: Abdominal aortic aneurysm  Doctor requesting consult: RAJIV Rhodes III, MD  Patient location during procedure: done in OR  Procedure Urgency: Emergent      Staffing  Authorizing Provider: Alexander Markham MD  Performing Provider: Pete Galeana MD    Staffing  Performed by: Pete Galeana MD  Authorized by: Alexander Markham MD    Anesthesiologist was present at the time of the procedure.  Preanesthetic Checklist  Completed: patient identified, IV checked, risks and benefits discussed, surgical consent, monitors and equipment checked, pre-op evaluation, timeout performed and anesthesia consent given  Indication   Indication: vascular access, med administration, hemodynamic monitoring, hemodialysis     Anesthesia   general anesthesia    Central Line   Skin Prep: skin prepped with ChloraPrep, skin prep agent completely dried prior to procedure  Sterile Barriers Followed: Yes    All five maximal barriers used- gloves, gown, cap, mask, and large sterile sheet    hand hygiene performed prior to central venous catheter insertion  Location: right internal jugular.   Catheter type: dialysis  Catheter Size: 14 Fr (13Fr)  Inserted Catheter Length: 15 cm  Ultrasound: vascular probe with ultrasound   Vessel Caliber: medium, patent, compressibility normal  Needle advanced into vessel with real time Ultrasound guidance.  Guidewire confirmed in vessel.  sterile gel and probe cover used in ultrasound-guided central venous catheter insertion   Manometry: Venous cannualation confirmed by visual estimation of blood vessel pressure using manometry.  Insertion Attempts: 2   Securement:line sutured, chlorhexidine patch, sterile dressing applied and blood return through all ports    Post-Procedure    Adverse Events:none      Guidewire Guidewire removed intact.   Additional Notes  First attempt using landmark approach due to ultrasound being unavailable. Successful venous puncture but  difficulty noted advancing wire.  Second attempt using ultrasound guidance with successful cannulation

## 2025-05-08 NOTE — ED TRIAGE NOTES
Dirk King, a 44 y.o. male presents to the ED w/ complaint of LLQ/flank pain that radiates down to the left groin rated at a 10/10 along with nausea and pain while urinating and urinary frequency.    Triage note:  Chief Complaint   Patient presents with    Flank Pain     Left flank pain radiates into left groin     Review of patient's allergies indicates:  No Known Allergies  No past medical history on file.

## 2025-05-08 NOTE — ED NOTES
I-STAT Chem-8+ Results:   Value Reference Range   Sodium 134 136-145 mmol/L   Potassium  3.3 3.5-5.1 mmol/L   Chloride 95  mmol/L   Ionized Calcium 1.12 1.06-1.42 mmol/L   CO2 (measured) 27 23-29 mmol/L   Glucose 147  mg/dL   BUN 13 6-30 mg/dL   Creatinine 1.1 0.5-1.4 mg/dL   Hematocrit 49 36-54%

## 2025-05-08 NOTE — EICU
Virtual ICU Quality Rounds    Admit Date: 5/8/2025  Hospital Day: 0    ICU Day: 2h    24H Vital Sign Range:  Temp:  [97.7 °F (36.5 °C)-98.3 °F (36.8 °C)]   Pulse:  []   Resp:  [0-24]   BP: (125-155)/()   SpO2:  [94 %-100 %]   Arterial Line BP: (126-151)/(78-87)     VICU Surveillance Screening    Daily review of  line necessity(optional): Central line(s) in place and Urinary catheter in place    Central line type (optional): Dialysis (tunneled)    Central line indications : Multiple infusions    Bunch Indications : Critically ill in the intensive care unit requiring hourly monitoring     LDA reconciliation : Yes

## 2025-05-09 LAB
ABO + RH BLD: NORMAL
ABSOLUTE EOSINOPHIL (OHS): 0.02 K/UL
ABSOLUTE MONOCYTE (OHS): 1.24 K/UL (ref 0.3–1)
ABSOLUTE NEUTROPHIL COUNT (OHS): 11.35 K/UL (ref 1.8–7.7)
ALBUMIN SERPL BCP-MCNC: 2 G/DL (ref 3.5–5.2)
ALBUMIN SERPL BCP-MCNC: 2.4 G/DL (ref 3.5–5.2)
ALP SERPL-CCNC: 50 UNIT/L (ref 40–150)
ALP SERPL-CCNC: 52 UNIT/L (ref 40–150)
ALT SERPL W/O P-5'-P-CCNC: 16 UNIT/L (ref 10–44)
ALT SERPL W/O P-5'-P-CCNC: 19 UNIT/L (ref 10–44)
ANION GAP (OHS): 4 MMOL/L (ref 8–16)
ANION GAP (OHS): 8 MMOL/L (ref 8–16)
AST SERPL-CCNC: 43 UNIT/L (ref 11–45)
AST SERPL-CCNC: 48 UNIT/L (ref 11–45)
BASOPHILS # BLD AUTO: 0.04 K/UL
BASOPHILS NFR BLD AUTO: 0.3 %
BILIRUB SERPL-MCNC: 1.9 MG/DL (ref 0.1–1)
BILIRUB SERPL-MCNC: 2.2 MG/DL (ref 0.1–1)
BLD PROD TYP BPU: NORMAL
BLOOD UNIT EXPIRATION DATE: NORMAL
BLOOD UNIT TYPE CODE: 5100
BLOOD UNIT TYPE CODE: 9500
BUN SERPL-MCNC: 10 MG/DL (ref 6–20)
BUN SERPL-MCNC: 6 MG/DL (ref 6–20)
CALCIUM SERPL-MCNC: 7.4 MG/DL (ref 8.7–10.5)
CALCIUM SERPL-MCNC: 7.4 MG/DL (ref 8.7–10.5)
CHLORIDE SERPL-SCNC: 106 MMOL/L (ref 95–110)
CHLORIDE SERPL-SCNC: 109 MMOL/L (ref 95–110)
CO2 SERPL-SCNC: 25 MMOL/L (ref 23–29)
CO2 SERPL-SCNC: 25 MMOL/L (ref 23–29)
CREAT SERPL-MCNC: 0.6 MG/DL (ref 0.5–1.4)
CREAT SERPL-MCNC: 0.7 MG/DL (ref 0.5–1.4)
CROSSMATCH INTERPRETATION: NORMAL
DISPENSE STATUS: NORMAL
ERYTHROCYTE [DISTWIDTH] IN BLOOD BY AUTOMATED COUNT: 14.5 % (ref 11.5–14.5)
GFR SERPLBLD CREATININE-BSD FMLA CKD-EPI: >60 ML/MIN/1.73/M2
GFR SERPLBLD CREATININE-BSD FMLA CKD-EPI: >60 ML/MIN/1.73/M2
GLUCOSE SERPL-MCNC: 105 MG/DL (ref 70–110)
GLUCOSE SERPL-MCNC: 112 MG/DL (ref 70–110)
GLUCOSE SERPL-MCNC: 95 MG/DL (ref 70–110)
HCO3 UR-SCNC: 23 MMOL/L (ref 24–28)
HCT VFR BLD AUTO: 30.3 % (ref 40–54)
HCT VFR BLD CALC: 33 %PCV (ref 36–54)
HGB BLD-MCNC: 10 GM/DL (ref 14–18)
IMM GRANULOCYTES # BLD AUTO: 0.12 K/UL (ref 0–0.04)
IMM GRANULOCYTES NFR BLD AUTO: 0.8 % (ref 0–0.5)
LYMPHOCYTES # BLD AUTO: 1.79 K/UL (ref 1–4.8)
MAGNESIUM SERPL-MCNC: 1.4 MG/DL (ref 1.6–2.6)
MCH RBC QN AUTO: 29.1 PG (ref 27–31)
MCHC RBC AUTO-ENTMCNC: 33 G/DL (ref 32–36)
MCV RBC AUTO: 88 FL (ref 82–98)
NUCLEATED RBC (/100WBC) (OHS): 0 /100 WBC
PCO2 BLDA: 37.2 MMHG (ref 35–45)
PH SMN: 7.4 [PH] (ref 7.35–7.45)
PHOSPHATE SERPL-MCNC: 3.2 MG/DL (ref 2.7–4.5)
PLATELET # BLD AUTO: 138 K/UL (ref 150–450)
PMV BLD AUTO: 9.8 FL (ref 9.2–12.9)
PO2 BLDA: 380 MMHG (ref 80–100)
POC BE: -2 MMOL/L (ref -2–2)
POC IONIZED CALCIUM: 1.1 MMOL/L (ref 1.06–1.42)
POC SATURATED O2: 100 % (ref 95–100)
POC TCO2: 24 MMOL/L (ref 23–27)
POTASSIUM BLD-SCNC: 3.9 MMOL/L (ref 3.5–5.1)
POTASSIUM SERPL-SCNC: 3.6 MMOL/L (ref 3.5–5.1)
POTASSIUM SERPL-SCNC: 4.1 MMOL/L (ref 3.5–5.1)
PROT SERPL-MCNC: 4.3 GM/DL (ref 6–8.4)
PROT SERPL-MCNC: 4.9 GM/DL (ref 6–8.4)
RBC # BLD AUTO: 3.44 M/UL (ref 4.6–6.2)
RELATIVE EOSINOPHIL (OHS): 0.1 %
RELATIVE LYMPHOCYTE (OHS): 12.3 % (ref 18–48)
RELATIVE MONOCYTE (OHS): 8.5 % (ref 4–15)
RELATIVE NEUTROPHIL (OHS): 78 % (ref 38–73)
SAMPLE: ABNORMAL
SODIUM BLD-SCNC: 135 MMOL/L (ref 136–145)
SODIUM SERPL-SCNC: 138 MMOL/L (ref 136–145)
SODIUM SERPL-SCNC: 139 MMOL/L (ref 136–145)
UNIT NUMBER: NORMAL
WBC # BLD AUTO: 14.56 K/UL (ref 3.9–12.7)

## 2025-05-09 PROCEDURE — 63600175 PHARM REV CODE 636 W HCPCS: Mod: JZ,TB

## 2025-05-09 PROCEDURE — 27200966 HC CLOSED SUCTION SYSTEM

## 2025-05-09 PROCEDURE — 25000003 PHARM REV CODE 250

## 2025-05-09 PROCEDURE — 94003 VENT MGMT INPAT SUBQ DAY: CPT

## 2025-05-09 PROCEDURE — 83735 ASSAY OF MAGNESIUM: CPT

## 2025-05-09 PROCEDURE — 99900026 HC AIRWAY MAINTENANCE (STAT)

## 2025-05-09 PROCEDURE — P9045 ALBUMIN (HUMAN), 5%, 250 ML: HCPCS | Mod: JZ,TB

## 2025-05-09 PROCEDURE — 99900035 HC TECH TIME PER 15 MIN (STAT)

## 2025-05-09 PROCEDURE — 94761 N-INVAS EAR/PLS OXIMETRY MLT: CPT

## 2025-05-09 PROCEDURE — 20000000 HC ICU ROOM

## 2025-05-09 PROCEDURE — 63600175 PHARM REV CODE 636 W HCPCS: Performed by: STUDENT IN AN ORGANIZED HEALTH CARE EDUCATION/TRAINING PROGRAM

## 2025-05-09 PROCEDURE — 85025 COMPLETE CBC W/AUTO DIFF WBC: CPT | Performed by: STUDENT IN AN ORGANIZED HEALTH CARE EDUCATION/TRAINING PROGRAM

## 2025-05-09 PROCEDURE — 63600175 PHARM REV CODE 636 W HCPCS

## 2025-05-09 PROCEDURE — 99291 CRITICAL CARE FIRST HOUR: CPT | Mod: ,,, | Performed by: ANESTHESIOLOGY

## 2025-05-09 PROCEDURE — 82040 ASSAY OF SERUM ALBUMIN: CPT | Performed by: STUDENT IN AN ORGANIZED HEALTH CARE EDUCATION/TRAINING PROGRAM

## 2025-05-09 PROCEDURE — 27100171 HC OXYGEN HIGH FLOW UP TO 24 HOURS

## 2025-05-09 PROCEDURE — 84100 ASSAY OF PHOSPHORUS: CPT

## 2025-05-09 RX ORDER — NOREPINEPHRINE BITARTRATE/D5W 4MG/250ML
PLASTIC BAG, INJECTION (ML) INTRAVENOUS
Status: COMPLETED
Start: 2025-05-09 | End: 2025-05-09

## 2025-05-09 RX ORDER — HYDROMORPHONE HCL IN 0.9% NACL 6 MG/30 ML
PATIENT CONTROLLED ANALGESIA SYRINGE INTRAVENOUS CONTINUOUS
Status: CANCELLED | OUTPATIENT
Start: 2025-05-09

## 2025-05-09 RX ORDER — HYDROMORPHONE HYDROCHLORIDE 1 MG/ML
1 INJECTION, SOLUTION INTRAMUSCULAR; INTRAVENOUS; SUBCUTANEOUS ONCE AS NEEDED
Status: DISCONTINUED | OUTPATIENT
Start: 2025-05-09 | End: 2025-05-10

## 2025-05-09 RX ORDER — FOLIC ACID 1 MG/1
1 TABLET ORAL DAILY
Status: COMPLETED | OUTPATIENT
Start: 2025-05-09 | End: 2025-05-11

## 2025-05-09 RX ORDER — NALOXONE HCL 0.4 MG/ML
0.02 VIAL (ML) INJECTION
Status: CANCELLED | OUTPATIENT
Start: 2025-05-09

## 2025-05-09 RX ORDER — ALBUMIN HUMAN 50 G/1000ML
25 SOLUTION INTRAVENOUS ONCE
Status: COMPLETED | OUTPATIENT
Start: 2025-05-09 | End: 2025-05-09

## 2025-05-09 RX ORDER — HYDROMORPHONE HCL IN 0.9% NACL 6 MG/30 ML
PATIENT CONTROLLED ANALGESIA SYRINGE INTRAVENOUS CONTINUOUS
Refills: 0 | Status: DISCONTINUED | OUTPATIENT
Start: 2025-05-09 | End: 2025-05-12

## 2025-05-09 RX ORDER — THIAMINE HCL 100 MG
100 TABLET ORAL DAILY
Status: DISCONTINUED | OUTPATIENT
Start: 2025-05-09 | End: 2025-05-10

## 2025-05-09 RX ORDER — LORAZEPAM 2 MG/ML
4 INJECTION INTRAMUSCULAR ONCE AS NEEDED
Status: COMPLETED | OUTPATIENT
Start: 2025-05-09 | End: 2025-05-09

## 2025-05-09 RX ORDER — HYDRALAZINE HYDROCHLORIDE 20 MG/ML
10 INJECTION INTRAMUSCULAR; INTRAVENOUS EVERY 4 HOURS PRN
Status: DISCONTINUED | OUTPATIENT
Start: 2025-05-09 | End: 2025-05-14 | Stop reason: HOSPADM

## 2025-05-09 RX ORDER — HEPARIN SODIUM 5000 [USP'U]/ML
5000 INJECTION, SOLUTION INTRAVENOUS; SUBCUTANEOUS EVERY 8 HOURS
Status: DISCONTINUED | OUTPATIENT
Start: 2025-05-09 | End: 2025-05-10

## 2025-05-09 RX ORDER — SODIUM CHLORIDE, SODIUM LACTATE, POTASSIUM CHLORIDE, CALCIUM CHLORIDE 600; 310; 30; 20 MG/100ML; MG/100ML; MG/100ML; MG/100ML
INJECTION, SOLUTION INTRAVENOUS CONTINUOUS
Status: DISCONTINUED | OUTPATIENT
Start: 2025-05-09 | End: 2025-05-10

## 2025-05-09 RX ORDER — NALOXONE HCL 0.4 MG/ML
0.02 VIAL (ML) INJECTION
Status: DISCONTINUED | OUTPATIENT
Start: 2025-05-09 | End: 2025-05-12

## 2025-05-09 RX ADMIN — SODIUM CHLORIDE, POTASSIUM CHLORIDE, SODIUM LACTATE AND CALCIUM CHLORIDE 500 ML: 600; 310; 30; 20 INJECTION, SOLUTION INTRAVENOUS at 01:05

## 2025-05-09 RX ADMIN — DEXMEDETOMIDINE HYDROCHLORIDE 1 MCG/KG/HR: 4 INJECTION INTRAVENOUS at 07:05

## 2025-05-09 RX ADMIN — Medication 100 MG: at 09:05

## 2025-05-09 RX ADMIN — ACETAMINOPHEN 1000 MG: 500 TABLET ORAL at 01:05

## 2025-05-09 RX ADMIN — POTASSIUM CHLORIDE 40 MEQ: 29.8 INJECTION, SOLUTION INTRAVENOUS at 10:05

## 2025-05-09 RX ADMIN — FAMOTIDINE 20 MG: 10 INJECTION, SOLUTION INTRAVENOUS at 08:05

## 2025-05-09 RX ADMIN — POTASSIUM CHLORIDE 40 MEQ: 29.8 INJECTION, SOLUTION INTRAVENOUS at 02:05

## 2025-05-09 RX ADMIN — LORAZEPAM 4 MG: 2 INJECTION INTRAMUSCULAR; INTRAVENOUS at 12:05

## 2025-05-09 RX ADMIN — HEPARIN SODIUM 5000 UNITS: 5000 INJECTION INTRAVENOUS; SUBCUTANEOUS at 01:05

## 2025-05-09 RX ADMIN — ALBUMIN (HUMAN) 25 G: 12.5 SOLUTION INTRAVENOUS at 08:05

## 2025-05-09 RX ADMIN — FOLIC ACID 1 MG: 1 TABLET ORAL at 09:05

## 2025-05-09 RX ADMIN — PROPOFOL 50 MCG/KG/MIN: 10 INJECTION, EMULSION INTRAVENOUS at 06:05

## 2025-05-09 RX ADMIN — HEPARIN SODIUM 5000 UNITS: 5000 INJECTION INTRAVENOUS; SUBCUTANEOUS at 08:05

## 2025-05-09 RX ADMIN — ASPIRIN 81 MG: 81 TABLET, COATED ORAL at 08:05

## 2025-05-09 RX ADMIN — OXYCODONE HYDROCHLORIDE 10 MG: 10 TABLET ORAL at 06:05

## 2025-05-09 RX ADMIN — PROPOFOL 50 MCG/KG/MIN: 10 INJECTION, EMULSION INTRAVENOUS at 03:05

## 2025-05-09 RX ADMIN — ACETAMINOPHEN 1000 MG: 500 TABLET ORAL at 08:05

## 2025-05-09 RX ADMIN — ACETAMINOPHEN 1000 MG: 500 TABLET ORAL at 05:05

## 2025-05-09 RX ADMIN — KETAMINE HYDROCHLORIDE 2.5 MCG/KG/MIN: 50 INJECTION INTRAMUSCULAR; INTRAVENOUS at 11:05

## 2025-05-09 RX ADMIN — SODIUM CHLORIDE, POTASSIUM CHLORIDE, SODIUM LACTATE AND CALCIUM CHLORIDE: 600; 310; 30; 20 INJECTION, SOLUTION INTRAVENOUS at 07:05

## 2025-05-09 RX ADMIN — MAGNESIUM SULFATE HEPTAHYDRATE 2 G: 40 INJECTION, SOLUTION INTRAVENOUS at 04:05

## 2025-05-09 RX ADMIN — Medication: at 08:05

## 2025-05-09 NOTE — PROGRESS NOTES
[facility]  Critical Care - Surgery  Progress Note    Patient Name: Dirk King  MRN: [id number]  Admission Date: 5/8/2025  Hospital Length of Stay: 1 days  Code Status: No Order  Attending Provider: JESSICA Rhodes III, MD  Primary Care Provider: No, Primary Doctor   Principal Problem: Ruptured infrarenal abdominal aortic aneurysm (AAA)    Subjective:     Hospital/ICU Course:  No notes on file    Interval History/Significant Events:   POD#1 from open repair of AAA   Still intubated, when sedation weaned he pulls high tidal volumes 600-700 but when comfortable tidal volumes around 400 otherwise on minimal vent settings   On prop 50, precedex 1, and fentanyl 100 to keep sedated  UOP has been labile overnight required 500 cc LR; this morning 45-75 cc/hr urine is dark   Pressures have been soft /55-67; not on any pressors        Follow-up For: Procedure(s) (LRB):  REPAIR-ANEURYSM-ABDOMINAL-AORTIC (AAA) class a @0619 (N/A)    Post-Operative Day: 1 Day Post-Op    Objective:     Vital Signs (Most Recent):  Temp: 97.9 °F (36.6 °C) (05/09/25 0700)  Pulse: 73 (05/09/25 0732)  Resp: 14 (05/09/25 0732)  BP: 104/65 (05/09/25 0500)  SpO2: 100 % (05/09/25 0732) Vital Signs (24h Range):  Temp:  [97.9 °F (36.6 °C)-98.4 °F (36.9 °C)] 97.9 °F (36.6 °C)  Pulse:  [] 73  Resp:  [0-30] 14  SpO2:  [99 %-100 %] 100 %  BP: ()/(55-67) 104/65  Arterial Line BP: ()/(47-90) 113/57     Weight: 90.7 kg (200 lb)  Body mass index is 31.32 kg/m².      Intake/Output Summary (Last 24 hours) at 5/9/2025 0754  Last data filed at 5/9/2025 0701  Gross per 24 hour   Intake 29764.04 ml   Output 71304 ml   Net 1777.04 ml          Physical Exam  Constitutional:       Comments: Intubated and sedated   HENT:      Head: Normocephalic and atraumatic.      Comments: OG tube in place     Nose: Nose normal.   Neck:      Comments: Trialysis catheter in right IJ.  Cardiovascular:      Rate and Rhythm: Normal rate  and regular rhythm.      Pulses: Normal pulses.      Comments: 1+ DP pulses bilaterally  Right radial arterial line in place  Pulmonary:      Comments: Ventilated  Abdominal:      Comments: Midline laparotomy incision bandaged, clean/dry   Genitourinary:     Comments: Bunch catheter in place  Skin:     General: Skin is warm and dry.   Neurological:      Comments: Intubated and sedated            Vents:  Vent Mode: A/C (05/09/25 0732)  Ventilator Initiated: Yes (05/08/25 1358)  Set Rate: 16 BPM (05/09/25 0732)  Vt Set: 430 mL (05/09/25 0732)  Pressure Support: 10 cmH20 (05/09/25 0414)  PEEP/CPAP: 5 cmH20 (05/09/25 0732)  Oxygen Concentration (%): 40 (05/09/25 0732)  Peak Airway Pressure: 12 cmH20 (05/09/25 0732)  Plateau Pressure: 0 cmH20 (05/09/25 0732)  Total Ve: 0 L/m (05/09/25 0732)  Negative Inspiratory Force (cm H2O): 0 (05/09/25 0732)  F/VT Ratio<105 (RSBI): (!) 34.19 (05/09/25 0414)    Lines/Drains/Airways       Central Venous Catheter Line  Duration                  Hemodialysis Catheter 05/08/25 0822 right internal jugular <1 day              Drain  Duration                  Urethral Catheter 05/08/25 0717 Straight-tip;Non-latex 16 Fr. 1 day         NG/OG Tube 05/08/25 1413 Center mouth <1 day              Airway  Duration                  Airway - Non-Surgical 05/08/25 0713 1 day              Arterial Line  Duration             Arterial Line 05/08/25 0818 Right Radial <1 day              Peripheral Intravenous Line  Duration                  Peripheral IV - Single Lumen 05/08/25 0231 20 G 1 in Anterior;Right Upper Arm 1 day         Peripheral IV - Single Lumen 05/08/25 0601 18 G 1 1/4 in Anterior;Left Upper Arm 1 day         Peripheral IV - Single Lumen 05/08/25 0712 14 G  1 1/4 in Right Forearm 1 day         Peripheral IV - Single Lumen 05/08/25 0735 14 G  1 1/4 in Left Forearm 1 day                    Significant Labs:    CBC/Anemia Profile:  Recent Labs   Lab 05/08/25  1428 05/08/25  1602 05/08/25  2037  05/08/25  2329   WBC 15.24*  --  18.65* 15.46*   HGB 12.3*  --  12.3* 11.1*   HCT 36.7* 38 35.2* 33.4*   *  --  171 154   MCV 87  --  84 88   RDW 13.7  --  14.0 14.3        Chemistries:  Recent Labs   Lab 05/08/25  0240 05/08/25  1428 05/08/25 2037 05/09/25  0313   * 135* 134*  --    K 3.3* 4.4 3.8  --    CL 95 109 107  --    CO2 24 21* 21*  --    BUN 12 8 10  --    CREATININE 1.0 0.6 0.7  --    CALCIUM 10.0 9.1 8.1*  --    ALBUMIN 3.9 2.3* 2.3*  --    PROT 7.8 4.6* 4.6*  --    BILITOT 2.4* 4.3* 5.6*  --    ALKPHOS 99 44 48  --    ALT 15 18 17  --    AST 17 31 47*  --    MG  --  1.5*  --  1.4*   PHOS  --  3.2  --  3.2       All pertinent labs within the past 24 hours have been reviewed.    Significant Imaging:  I have reviewed all pertinent imaging results/findings within the past 24 hours.  Assessment/Plan:     Cardiac/Vascular  * Ruptured infrarenal abdominal aortic aneurysm (AAA)    Neuro/Psych:     - Sedation: propofol, precedex    - Pain:     - Scheduled Tylenol 1000mg Q8H   - PRN Oxycodone 5mg/10mg, fentanyl for breakthrough pain     - Psych: intubated/sedated. Neuro exam normal when weaned from sedation             Cardiac:     - S/p AAA open repair (16x8 bifurcated dacron graft) with Dr. Rhodes.     - BP Goal: -160    - Inotropes/Pressors: none    - Cardene drip    - Rhythm: NSR    - lactic acid resolved      Pulmonary:     - intubated and sedated. Will wean ventilator support as tolerated to extubate    - Goal SpO2 >92%    - ABGS PRN    - CXR     - goal to extubate today with ketamine, dilaudid, and ativan      Renal:    - Trend BUN/Cr     - Maintain Bunch, record strict Is/Os    - Gave 500 albumin    - consider diuresis in the future    Recent Labs   Lab 05/08/25  1428 05/08/25 2037 05/09/25  0742   BUN 8 10 10   CREATININE 0.6 0.7 0.7           FEN / GI:     - Daily CMP, PRN K/Mag/Phos per protocol     - Replace electrolytes as needed    - Nutrition: NPO pending extubation    -  Bowel Regimen: Miralax, docusate      ID:     - Aebrile    Recent Labs   Lab 05/08/25 2037 05/08/25 2329 05/09/25  0742   WBC 18.65* 15.46* 14.56*           Heme/Onc:     - Hgb 15.3 pre-operatively    - CBC Q8H     - Will start DVT ppx today    Recent Labs   Lab 05/08/25  0553 05/08/25  1207 05/08/25  1428 05/08/25  1446 05/08/25 2037 05/08/25 2329 05/09/25  0742   HGB  --  11.3*   < >  --  12.3* 11.1* 10.0*   PLT  --  147*   < >  --  171 154 138*   APTT  --  27.1  --  26.9  --   --   --    INR 1.1 1.3*  --  1.1  --   --   --     < > = values in this interval not displayed.           Endocrine:     - CTS Goal -140    - Insulin drip      PPx:   Feeding: NPO, will advance as tolerated following extubation   Analgesia/Sedation: Tylenol 1000mg Q8H, prop, precedex, fentanyl  Thromboembolic Prevention: SCDs, SQH   HOB >30: Yes  Stress Ulcer: not indicated  Glucose Control: Yes, insulin drip     Lines/Drains/Airway:   ETT    Art Line: right radial    Central Line: right IJ trialysis   Bunch          Dispo/Code Status/Palliative:     - Continue SICU Care    - Full Code          Brittany Haq MD  Critical Care - Surgery  [facility] - Surgical Intensive Care

## 2025-05-09 NOTE — ASSESSMENT & PLAN NOTE
44-year-old gentleman presenting with 5 days of severe back pain, imaging concerning for symptomatic AAA due to connective tissue disorder.     POD1 s/p urgent open repair of ruptured AAA with 16x8 bifurcated dacron graft. Awake and intubated in NAD. Pulses L DP 2+, R PT and DP signals.     - Wean to extubate this AM as tolerated  - C/w ASA  - LR @ 100  - Appreciate SICU care

## 2025-05-09 NOTE — ANESTHESIA POSTPROCEDURE EVALUATION
Anesthesia Post Evaluation    Patient: Dirk King    Procedure(s) Performed: Procedure(s) (LRB):  REPAIR-ANEURYSM-ABDOMINAL-AORTIC (AAA) class a @0619 (N/A)    Final Anesthesia Type: general      Patient location during evaluation: ICU  Patient participation: No - Unable to Participate, Intubation  Level of consciousness: responds to stimulation  Post-procedure vital signs: reviewed and stable  Pain management: adequate  Airway patency: patent      Anesthetic complications: no      Cardiovascular status: hemodynamically stable  Respiratory status: ETT  Follow-up not needed.                  No case tracking events are documented in the log.      Pain/Saurav Score: Pain Rating Prior to Med Admin: 0 (5/9/2025  5:07 AM)  Pain Rating Post Med Admin: 6 (5/8/2025  4:37 AM)

## 2025-05-09 NOTE — SUBJECTIVE & OBJECTIVE
Interval History/Significant Events:   POD#1 from open repair of AAA   Still intubated, when sedation weaned he pulls high tidal volumes 600-700 but when comfortable tidal volumes around 400 otherwise on minimal vent settings   On prop 50, precedex 1, and fentanyl 100 to keep sedated  UOP has been labile overnight required 500 cc LR; this morning 45-75 cc/hr urine is dark   Pressures have been soft /55-67; not on any pressors        Follow-up For: Procedure(s) (LRB):  REPAIR-ANEURYSM-ABDOMINAL-AORTIC (AAA) class a @0619 (N/A)    Post-Operative Day: 1 Day Post-Op    Objective:     Vital Signs (Most Recent):  Temp: 97.9 °F (36.6 °C) (05/09/25 0700)  Pulse: 73 (05/09/25 0732)  Resp: 14 (05/09/25 0732)  BP: 104/65 (05/09/25 0500)  SpO2: 100 % (05/09/25 0732) Vital Signs (24h Range):  Temp:  [97.9 °F (36.6 °C)-98.4 °F (36.9 °C)] 97.9 °F (36.6 °C)  Pulse:  [] 73  Resp:  [0-30] 14  SpO2:  [99 %-100 %] 100 %  BP: ()/(55-67) 104/65  Arterial Line BP: ()/(47-90) 113/57     Weight: 90.7 kg (200 lb)  Body mass index is 31.32 kg/m².      Intake/Output Summary (Last 24 hours) at 5/9/2025 0754  Last data filed at 5/9/2025 0701  Gross per 24 hour   Intake 57564.04 ml   Output 16364 ml   Net 1777.04 ml          Physical Exam  Constitutional:       Comments: Intubated and sedated   HENT:      Head: Normocephalic and atraumatic.      Comments: OG tube in place     Nose: Nose normal.   Neck:      Comments: Trialysis catheter in right IJ.  Cardiovascular:      Rate and Rhythm: Normal rate and regular rhythm.      Pulses: Normal pulses.      Comments: 1+ DP pulses bilaterally  Right radial arterial line in place  Pulmonary:      Comments: Ventilated  Abdominal:      Comments: Midline laparotomy incision bandaged, clean/dry   Genitourinary:     Comments: Bunch catheter in place  Skin:     General: Skin is warm and dry.   Neurological:      Comments: Intubated and sedated            Vents:  Vent Mode: A/C (05/09/25  0732)  Ventilator Initiated: Yes (05/08/25 1358)  Set Rate: 16 BPM (05/09/25 0732)  Vt Set: 430 mL (05/09/25 0732)  Pressure Support: 10 cmH20 (05/09/25 0414)  PEEP/CPAP: 5 cmH20 (05/09/25 0732)  Oxygen Concentration (%): 40 (05/09/25 0732)  Peak Airway Pressure: 12 cmH20 (05/09/25 0732)  Plateau Pressure: 0 cmH20 (05/09/25 0732)  Total Ve: 0 L/m (05/09/25 0732)  Negative Inspiratory Force (cm H2O): 0 (05/09/25 0732)  F/VT Ratio<105 (RSBI): (!) 34.19 (05/09/25 0414)    Lines/Drains/Airways       Central Venous Catheter Line  Duration                  Hemodialysis Catheter 05/08/25 0822 right internal jugular <1 day              Drain  Duration                  Urethral Catheter 05/08/25 0717 Straight-tip;Non-latex 16 Fr. 1 day         NG/OG Tube 05/08/25 1413 Center mouth <1 day              Airway  Duration                  Airway - Non-Surgical 05/08/25 0713 1 day              Arterial Line  Duration             Arterial Line 05/08/25 0818 Right Radial <1 day              Peripheral Intravenous Line  Duration                  Peripheral IV - Single Lumen 05/08/25 0231 20 G 1 in Anterior;Right Upper Arm 1 day         Peripheral IV - Single Lumen 05/08/25 0601 18 G 1 1/4 in Anterior;Left Upper Arm 1 day         Peripheral IV - Single Lumen 05/08/25 0712 14 G  1 1/4 in Right Forearm 1 day         Peripheral IV - Single Lumen 05/08/25 0735 14 G  1 1/4 in Left Forearm 1 day                    Significant Labs:    CBC/Anemia Profile:  Recent Labs   Lab 05/08/25  1428 05/08/25  1602 05/08/25  2037 05/08/25  2329   WBC 15.24*  --  18.65* 15.46*   HGB 12.3*  --  12.3* 11.1*   HCT 36.7* 38 35.2* 33.4*   *  --  171 154   MCV 87  --  84 88   RDW 13.7  --  14.0 14.3        Chemistries:  Recent Labs   Lab 05/08/25  0240 05/08/25  1428 05/08/25 2037 05/09/25 0313   * 135* 134*  --    K 3.3* 4.4 3.8  --    CL 95 109 107  --    CO2 24 21* 21*  --    BUN 12 8 10  --    CREATININE 1.0 0.6 0.7  --    CALCIUM 10.0 9.1  8.1*  --    ALBUMIN 3.9 2.3* 2.3*  --    PROT 7.8 4.6* 4.6*  --    BILITOT 2.4* 4.3* 5.6*  --    ALKPHOS 99 44 48  --    ALT 15 18 17  --    AST 17 31 47*  --    MG  --  1.5*  --  1.4*   PHOS  --  3.2  --  3.2       All pertinent labs within the past 24 hours have been reviewed.    Significant Imaging:  I have reviewed all pertinent imaging results/findings within the past 24 hours.

## 2025-05-09 NOTE — PLAN OF CARE
SICU PLAN OF CARE    Dx: Ruptured infrarenal abdominal aortic aneurysm (AAA)    Goals of Care: SBP <160    Vital Signs (last 12 hours):   Temp:  [97.9 °F (36.6 °C)-98.2 °F (36.8 °C)]   Pulse:  [63-94]   Resp:  [8-21]   BP: ()/(50-77)   SpO2:  [96 %-100 %]   Arterial Line BP: ()/(43-72)      Neuro: AAOx4    Cardiac: Rhythm: normal sinus rhythm        Respiratory: Room Air    Gtts: MIVF    Urine Output: Urethral Catheter  1025 mL/shift      Diet: NPO        Labs/Accuchecks: CMP Q 12    Skin:  Surgical wound to abdomen .  Patient turned q2h, bony prominences protected, and mattress inflated/working correctly.     Shift Events:  Extubated, following commands, dressing change..  See flowsheet for further assessment/details.  Family updated on current condition/plan of care, questions answered, and emotional support provided.  MD updated on current condition, vitals, labs, and gtts.

## 2025-05-09 NOTE — EICU
VICU Night Rounds Checklist  24H Vital Sign Range:  Temp:  [97.7 °F (36.5 °C)-98.4 °F (36.9 °C)]   Pulse:  []   Resp:  [0-30]   BP: ()/()   SpO2:  [94 %-100 %]   Arterial Line BP: ()/(50-90)     Video rounds, LDA reconciliation, and Restraints

## 2025-05-09 NOTE — SUBJECTIVE & OBJECTIVE
Medications:  Continuous Infusions:   dexmedeTOMIDine (Precedex) infusion (titrating)  0-1.4 mcg/kg/hr Intravenous Continuous 22.68 mL/hr at 05/09/25 0738 1 mcg/kg/hr at 05/09/25 0738    fentanyl  0-250 mcg/hr Intravenous Continuous 10 mL/hr at 05/09/25 0600 100 mcg/hr at 05/09/25 0600    lactated ringers   Intravenous Continuous 100 mL/hr at 05/09/25 0712 New Bag at 05/09/25 0712    nicardipine  0-15 mg/hr Intravenous Continuous   Stopped at 05/08/25 1809    propofoL  0-50 mcg/kg/min Intravenous Continuous 27.2 mL/hr at 05/09/25 0617 50 mcg/kg/min at 05/09/25 0617     Scheduled Meds:   acetaminophen  1,000 mg Per OG tube Q8H    albumin human 5%  25 g Intravenous Once    aspirin  81 mg Oral Daily    famotidine (PF)  20 mg Intravenous BID     PRN Meds:  Current Facility-Administered Medications:     0.9%  NaCl infusion (for blood administration), , Intravenous, Q24H PRN    calcium gluconate IVPB, 1 g, Intravenous, PRN    calcium gluconate IVPB, 2 g, Intravenous, PRN    calcium gluconate IVPB, 3 g, Intravenous, PRN    docusate sodium, 50 mg, Oral, BID PRN    magnesium sulfate 2 g IVPB, 2 g, Intravenous, PRN    magnesium sulfate 2 g IVPB, 2 g, Intravenous, PRN    oxyCODONE, 5 mg, Oral, Q4H PRN    oxyCODONE, 10 mg, Oral, Q6H PRN    polyethylene glycol, 17 g, Oral, Daily PRN    potassium chloride in water, 40 mEq, Intravenous, PRN **AND** potassium chloride in water, 40 mEq, Intravenous, PRN **AND** potassium chloride in water, 40 mEq, Intravenous, PRN    sodium phosphate 20.1 mmol in D5W 250 mL IVPB, 20.1 mmol, Intravenous, PRN    sodium phosphate 30 mmol in D5W 250 mL IVPB, 30 mmol, Intravenous, PRN     Objective:     Vital Signs (Most Recent):  Temp: 97.9 °F (36.6 °C) (05/09/25 0700)  Pulse: 73 (05/09/25 0732)  Resp: 14 (05/09/25 0732)  BP: 104/65 (05/09/25 0500)  SpO2: 100 % (05/09/25 0732) Vital Signs (24h Range):  Temp:  [97.9 °F (36.6 °C)-98.4 °F (36.9 °C)] 97.9 °F (36.6 °C)  Pulse:  [] 73  Resp:  [0-30]  14  SpO2:  [99 %-100 %] 100 %  BP: ()/(55-67) 104/65  Arterial Line BP: ()/(47-90) 113/57     Date 05/09/25 0700 - 05/10/25 0659   Shift 8599-2561 2617-7916 6993-9316 24 Hour Total   INTAKE   Shift Total(mL/kg)       OUTPUT   Urine(mL/kg/hr) 125   125   Shift Total(mL/kg) 125(1.4)   125(1.4)   Weight (kg) 90.7 90.7 90.7 90.7        Physical Exam  Vitals reviewed.   Constitutional:       Comments: Intubated   HENT:      Head: Normocephalic and atraumatic.      Comments: OG tube in place     Nose: Nose normal.   Neck:      Comments: Trialysis catheter in right IJ.  Cardiovascular:      Rate and Rhythm: Normal rate and regular rhythm.      Pulses: Normal pulses.      Comments: L DP 2+, R PT and DP signals  R radial luh  Pulmonary:      Comments: Ventilated  Abdominal:      Comments: Midline laparotomy incision bandaged, clean/dry   Genitourinary:     Comments: Bunch   Skin:     General: Skin is warm and dry.   Neurological:      Comments: Intubated           Significant Labs:  CBC:   Recent Labs   Lab 05/09/25  0742   WBC 14.56*   RBC 3.44*   HGB 10.0*   HCT 30.3*   *   MCV 88   MCH 29.1   MCHC 33.0     CMP:   Recent Labs   Lab 05/09/25  0742      CALCIUM 7.4*   ALBUMIN 2.0*   PROT 4.3*      K 4.1   CO2 25      BUN 10   CREATININE 0.7   ALKPHOS 50   ALT 19   AST 48*   BILITOT 2.2*       Significant Diagnostics:  I have reviewed all pertinent imaging results/findings within the past 24 hours.

## 2025-05-09 NOTE — EICU
Intervention Initiated From:  [facility] / EICU    Oniel intervened regarding:  Rounding (Video assessment)    Comments: Virtual ICU Quality Rounds    Admit Date: 5/8/2025  Hospital Day: 1    ICU Day: 23h    24H Vital Sign Range:  Temp:  [97.9 °F (36.6 °C)-98.4 °F (36.9 °C)]   Pulse:  []   Resp:  [0-30]   BP: ()/(50-77)   SpO2:  [96 %-100 %]   Arterial Line BP: ()/(43-90)     VICU Surveillance Screening    Daily review of  line necessity(optional): Central line(s) in place and Urinary catheter in place    Central line type (optional): Dialysis(non-tunneled)    Central line indications : Multiple infusions    Bunch Indications : Critically ill in the intensive care unit requiring hourly monitoring     LDA reconciliation : Yes

## 2025-05-09 NOTE — PROGRESS NOTES
[facility]  Vascular Surgery  Progress Note    Patient Name: Dirk King  MRN: [id number]  Admission Date: 5/8/2025  Primary Care Provider: Daysi, Primary Doctor    Subjective:     Interval History: POD1 s/p urgent open repair of ruptured AAA with 16x8 bifurcated dacron graft. Awake and intubated in NAD; prop 50, fent 75. Pulses L DP 2+, R PT and DP signals.    Post-Op Info:  Procedure(s) (LRB):  REPAIR-ANEURYSM-ABDOMINAL-AORTIC (AAA) class a @0619 (N/A)   1 Day Post-Op     Medications:  Continuous Infusions:   dexmedeTOMIDine (Precedex) infusion (titrating)  0-1.4 mcg/kg/hr Intravenous Continuous 22.68 mL/hr at 05/09/25 0738 1 mcg/kg/hr at 05/09/25 0738    fentanyl  0-250 mcg/hr Intravenous Continuous 10 mL/hr at 05/09/25 0600 100 mcg/hr at 05/09/25 0600    lactated ringers   Intravenous Continuous 100 mL/hr at 05/09/25 0712 New Bag at 05/09/25 0712    nicardipine  0-15 mg/hr Intravenous Continuous   Stopped at 05/08/25 1809    propofoL  0-50 mcg/kg/min Intravenous Continuous 27.2 mL/hr at 05/09/25 0617 50 mcg/kg/min at 05/09/25 0617     Scheduled Meds:   acetaminophen  1,000 mg Per OG tube Q8H    albumin human 5%  25 g Intravenous Once    aspirin  81 mg Oral Daily    famotidine (PF)  20 mg Intravenous BID     PRN Meds:  Current Facility-Administered Medications:     0.9%  NaCl infusion (for blood administration), , Intravenous, Q24H PRN    calcium gluconate IVPB, 1 g, Intravenous, PRN    calcium gluconate IVPB, 2 g, Intravenous, PRN    calcium gluconate IVPB, 3 g, Intravenous, PRN    docusate sodium, 50 mg, Oral, BID PRN    magnesium sulfate 2 g IVPB, 2 g, Intravenous, PRN    magnesium sulfate 2 g IVPB, 2 g, Intravenous, PRN    oxyCODONE, 5 mg, Oral, Q4H PRN    oxyCODONE, 10 mg, Oral, Q6H PRN    polyethylene glycol, 17 g, Oral, Daily PRN    potassium chloride in water, 40 mEq, Intravenous, PRN **AND** potassium chloride in water, 40 mEq, Intravenous, PRN **AND** potassium chloride in  water, 40 mEq, Intravenous, PRN    sodium phosphate 20.1 mmol in D5W 250 mL IVPB, 20.1 mmol, Intravenous, PRN    sodium phosphate 30 mmol in D5W 250 mL IVPB, 30 mmol, Intravenous, PRN     Objective:     Vital Signs (Most Recent):  Temp: 97.9 °F (36.6 °C) (05/09/25 0700)  Pulse: 73 (05/09/25 0732)  Resp: 14 (05/09/25 0732)  BP: 104/65 (05/09/25 0500)  SpO2: 100 % (05/09/25 0732) Vital Signs (24h Range):  Temp:  [97.9 °F (36.6 °C)-98.4 °F (36.9 °C)] 97.9 °F (36.6 °C)  Pulse:  [] 73  Resp:  [0-30] 14  SpO2:  [99 %-100 %] 100 %  BP: ()/(55-67) 104/65  Arterial Line BP: ()/(47-90) 113/57     Date 05/09/25 0700 - 05/10/25 0659   Shift 3466-9700 9977-2457 2835-2426 24 Hour Total   INTAKE   Shift Total(mL/kg)       OUTPUT   Urine(mL/kg/hr) 125   125   Shift Total(mL/kg) 125(1.4)   125(1.4)   Weight (kg) 90.7 90.7 90.7 90.7        Physical Exam  Vitals reviewed.   Constitutional:       Comments: Intubated   HENT:      Head: Normocephalic and atraumatic.      Comments: OG tube     Nose: Nose normal.   Cardiovascular:      Rate and Rhythm: Normal rate and regular rhythm.      Pulses: Normal pulses.      Comments: L DP 2+, R PT and DP signals  R radial luh  RIJ trialysis  Pulmonary:      Comments: Ventilated  Abdominal:      Comments: Midline incision with overlying dressing with minimal strikethrough   Genitourinary:     Comments: Bunch   Skin:     General: Skin is warm and dry.   Neurological:      Comments: Intubated           Significant Labs:  CBC:   Recent Labs   Lab 05/09/25  0742   WBC 14.56*   RBC 3.44*   HGB 10.0*   HCT 30.3*   *   MCV 88   MCH 29.1   MCHC 33.0     CMP:   Recent Labs   Lab 05/09/25  0742      CALCIUM 7.4*   ALBUMIN 2.0*   PROT 4.3*      K 4.1   CO2 25      BUN 10   CREATININE 0.7   ALKPHOS 50   ALT 19   AST 48*   BILITOT 2.2*       Significant Diagnostics:  I have reviewed all pertinent imaging results/findings within the past 24 hours.  Assessment/Plan:      * Ruptured infrarenal abdominal aortic aneurysm (AAA)  44-year-old gentleman presenting with 5 days of severe back pain, imaging concerning for symptomatic AAA due to connective tissue disorder.     POD1 s/p urgent open repair of ruptured AAA with 16x8 bifurcated dacron graft. Awake and intubated in NAD. Pulses L DP 2+, R PT and DP signals.     - Wean to extubate this AM as tolerated  - C/w ASA  - LR @ 100  - Appreciate SICU care        Maida Oakley MD  Vascular Surgery  [facility]

## 2025-05-09 NOTE — PLAN OF CARE
SICU PLAN OF CARE    Dx: Ruptured infrarenal abdominal aortic aneurysm (AAA)    Goals of Care: -160    Vital Signs (last 12 hours):   Temp:  [98.3 °F (36.8 °C)-98.4 °F (36.9 °C)]   Pulse:  [68-89]   Resp:  [7-30]   BP: ()/(55-67)   SpO2:  [99 %-100 %]   Arterial Line BP: ()/(47-79)      Neuro: Follows commands , Arouses to Voice, Moves all extremities spontaneously , Intubated , and Sedated    Cardiac: Rhythm: normal sinus rhythm    Respiratory: Ventilator; Mode: A/C, Volume Control, 40% FiO2, 5 PEEP    Gtts: Propofol, Precedex, and Fentanyl     Urine Output: Urethral Catheter  715 mL/shift    Diet: NPO     Restraints Non-violent    Skin:  No new skin breakdowns noted.  Patient turned q2h, bony prominences protected, and mattress inflated/working correctly.     Shift Events:  Patient started on a fentanyl gtt for pain management. Gave 500cc bolus of LR. See flowsheet for further assessment/details.  Family updated on current condition/plan of care, questions answered, and emotional support provided.  MD updated on current condition, vitals, labs, and gtts.

## 2025-05-09 NOTE — ASSESSMENT & PLAN NOTE
Neuro/Psych:     - Sedation: propofol, precedex    - Pain:     - Scheduled Tylenol 1000mg Q8H   - PRN Oxycodone 5mg/10mg, fentanyl for breakthrough pain     - Psych: intubated/sedated. Neuro exam normal when weaned from sedation             Cardiac:     - S/p AAA open repair (16x8 bifurcated dacron graft) with Dr. Rhodes.     - BP Goal: -160    - Inotropes/Pressors: none    - Cardene drip    - Rhythm: NSR    - lactic acid resolved      Pulmonary:     - intubated and sedated. Will wean ventilator support as tolerated to extubate    - Goal SpO2 >92%    - ABGS PRN    - CXR     - goal to extubate today with ketamine, dilaudid, and ativan      Renal:    - Trend BUN/Cr     - Maintain Bunch, record strict Is/Os    - Gave 500 albumin    - consider diuresis in the future    Recent Labs   Lab 05/08/25 1428 05/08/25 2037 05/09/25  0742   BUN 8 10 10   CREATININE 0.6 0.7 0.7           FEN / GI:     - Daily CMP, PRN K/Mag/Phos per protocol     - Replace electrolytes as needed    - Nutrition: NPO pending extubation    - Bowel Regimen: Miralax, docusate      ID:     - Aebrile    Recent Labs   Lab 05/08/25 2037 05/08/25 2329 05/09/25  0742   WBC 18.65* 15.46* 14.56*           Heme/Onc:     - Hgb 15.3 pre-operatively    - CBC Q8H     - Will start DVT ppx today    Recent Labs   Lab 05/08/25  0553 05/08/25  1207 05/08/25 1428 05/08/25  1446 05/08/25 2037 05/08/25 2329 05/09/25  0742   HGB  --  11.3*   < >  --  12.3* 11.1* 10.0*   PLT  --  147*   < >  --  171 154 138*   APTT  --  27.1  --  26.9  --   --   --    INR 1.1 1.3*  --  1.1  --   --   --     < > = values in this interval not displayed.           Endocrine:     - CTS Goal -140    - Insulin drip      PPx:   Feeding: NPO, will advance as tolerated following extubation   Analgesia/Sedation: Tylenol 1000mg Q8H, prop, precedex, fentanyl  Thromboembolic Prevention: SCDs, lovenox   HOB >30: Yes  Stress Ulcer: not indicated  Glucose Control: Yes, insulin  drip     Lines/Drains/Airway:   ETT    Art Line: right radial    Central Line: right IJ trialysis   Alivia          Dispo/Code Status/Palliative:     - Continue SICU Care    - Full Code

## 2025-05-10 LAB
ABSOLUTE EOSINOPHIL (OHS): 0.05 K/UL
ABSOLUTE MONOCYTE (OHS): 1.13 K/UL (ref 0.3–1)
ABSOLUTE NEUTROPHIL COUNT (OHS): 9.88 K/UL (ref 1.8–7.7)
ALBUMIN SERPL BCP-MCNC: 2.3 G/DL (ref 3.5–5.2)
ALP SERPL-CCNC: 52 UNIT/L (ref 40–150)
ALT SERPL W/O P-5'-P-CCNC: 16 UNIT/L (ref 10–44)
ANION GAP (OHS): 7 MMOL/L (ref 8–16)
ANION GAP (OHS): 8 MMOL/L (ref 8–16)
AST SERPL-CCNC: 41 UNIT/L (ref 11–45)
BASOPHILS # BLD AUTO: 0.04 K/UL
BASOPHILS NFR BLD AUTO: 0.3 %
BILIRUB SERPL-MCNC: 1.7 MG/DL (ref 0.1–1)
BUN SERPL-MCNC: 4 MG/DL (ref 6–20)
BUN SERPL-MCNC: 5 MG/DL (ref 6–20)
CALCIUM SERPL-MCNC: 7.8 MG/DL (ref 8.7–10.5)
CALCIUM SERPL-MCNC: 8.1 MG/DL (ref 8.7–10.5)
CHLORIDE SERPL-SCNC: 101 MMOL/L (ref 95–110)
CHLORIDE SERPL-SCNC: 104 MMOL/L (ref 95–110)
CO2 SERPL-SCNC: 25 MMOL/L (ref 23–29)
CO2 SERPL-SCNC: 25 MMOL/L (ref 23–29)
CREAT SERPL-MCNC: 0.5 MG/DL (ref 0.5–1.4)
CREAT SERPL-MCNC: 0.6 MG/DL (ref 0.5–1.4)
ERYTHROCYTE [DISTWIDTH] IN BLOOD BY AUTOMATED COUNT: 13.9 % (ref 11.5–14.5)
GFR SERPLBLD CREATININE-BSD FMLA CKD-EPI: >60 ML/MIN/1.73/M2
GFR SERPLBLD CREATININE-BSD FMLA CKD-EPI: >60 ML/MIN/1.73/M2
GLUCOSE SERPL-MCNC: 82 MG/DL (ref 70–110)
GLUCOSE SERPL-MCNC: 85 MG/DL (ref 70–110)
HCT VFR BLD AUTO: 29.7 % (ref 40–54)
HGB BLD-MCNC: 9.8 GM/DL (ref 14–18)
IMM GRANULOCYTES # BLD AUTO: 0.08 K/UL (ref 0–0.04)
IMM GRANULOCYTES NFR BLD AUTO: 0.6 % (ref 0–0.5)
LYMPHOCYTES # BLD AUTO: 1.71 K/UL (ref 1–4.8)
MAGNESIUM SERPL-MCNC: 1.7 MG/DL (ref 1.6–2.6)
MCH RBC QN AUTO: 29.6 PG (ref 27–31)
MCHC RBC AUTO-ENTMCNC: 33 G/DL (ref 32–36)
MCV RBC AUTO: 90 FL (ref 82–98)
NUCLEATED RBC (/100WBC) (OHS): 0 /100 WBC
PHOSPHATE SERPL-MCNC: 1.4 MG/DL (ref 2.7–4.5)
PLATELET # BLD AUTO: 140 K/UL (ref 150–450)
PMV BLD AUTO: 9.8 FL (ref 9.2–12.9)
POTASSIUM SERPL-SCNC: 3.8 MMOL/L (ref 3.5–5.1)
POTASSIUM SERPL-SCNC: 3.9 MMOL/L (ref 3.5–5.1)
PROT SERPL-MCNC: 5.1 GM/DL (ref 6–8.4)
RBC # BLD AUTO: 3.31 M/UL (ref 4.6–6.2)
RELATIVE EOSINOPHIL (OHS): 0.4 %
RELATIVE LYMPHOCYTE (OHS): 13.3 % (ref 18–48)
RELATIVE MONOCYTE (OHS): 8.8 % (ref 4–15)
RELATIVE NEUTROPHIL (OHS): 76.6 % (ref 38–73)
SODIUM SERPL-SCNC: 133 MMOL/L (ref 136–145)
SODIUM SERPL-SCNC: 137 MMOL/L (ref 136–145)
WBC # BLD AUTO: 12.89 K/UL (ref 3.9–12.7)

## 2025-05-10 PROCEDURE — 25000003 PHARM REV CODE 250

## 2025-05-10 PROCEDURE — 20000000 HC ICU ROOM

## 2025-05-10 PROCEDURE — 63600175 PHARM REV CODE 636 W HCPCS

## 2025-05-10 PROCEDURE — 85025 COMPLETE CBC W/AUTO DIFF WBC: CPT | Performed by: SURGERY

## 2025-05-10 PROCEDURE — 94761 N-INVAS EAR/PLS OXIMETRY MLT: CPT

## 2025-05-10 PROCEDURE — 84520 ASSAY OF UREA NITROGEN: CPT | Performed by: SURGERY

## 2025-05-10 PROCEDURE — 25000003 PHARM REV CODE 250: Performed by: SURGERY

## 2025-05-10 PROCEDURE — 99291 CRITICAL CARE FIRST HOUR: CPT | Mod: ,,, | Performed by: ANESTHESIOLOGY

## 2025-05-10 PROCEDURE — 84100 ASSAY OF PHOSPHORUS: CPT

## 2025-05-10 PROCEDURE — 83735 ASSAY OF MAGNESIUM: CPT

## 2025-05-10 RX ORDER — AMLODIPINE BESYLATE 10 MG/1
10 TABLET ORAL DAILY
Status: DISCONTINUED | OUTPATIENT
Start: 2025-05-10 | End: 2025-05-12

## 2025-05-10 RX ORDER — NICARDIPINE HYDROCHLORIDE 0.2 MG/ML
0-15 INJECTION INTRAVENOUS CONTINUOUS
Status: DISCONTINUED | OUTPATIENT
Start: 2025-05-10 | End: 2025-05-11

## 2025-05-10 RX ORDER — ENOXAPARIN SODIUM 100 MG/ML
40 INJECTION SUBCUTANEOUS EVERY 24 HOURS
Status: DISCONTINUED | OUTPATIENT
Start: 2025-05-10 | End: 2025-05-14 | Stop reason: HOSPADM

## 2025-05-10 RX ORDER — THIAMINE HCL 100 MG
100 TABLET ORAL DAILY
Status: COMPLETED | OUTPATIENT
Start: 2025-05-11 | End: 2025-05-11

## 2025-05-10 RX ORDER — MUPIROCIN 20 MG/G
OINTMENT TOPICAL 2 TIMES DAILY
Status: DISCONTINUED | OUTPATIENT
Start: 2025-05-10 | End: 2025-05-14 | Stop reason: HOSPADM

## 2025-05-10 RX ORDER — LIDOCAINE 50 MG/G
2 PATCH TOPICAL
Status: DISCONTINUED | OUTPATIENT
Start: 2025-05-10 | End: 2025-05-14 | Stop reason: HOSPADM

## 2025-05-10 RX ORDER — ACETAMINOPHEN 500 MG
1000 TABLET ORAL EVERY 8 HOURS
Status: DISCONTINUED | OUTPATIENT
Start: 2025-05-10 | End: 2025-05-12

## 2025-05-10 RX ADMIN — Medication 100 MG: at 10:05

## 2025-05-10 RX ADMIN — POTASSIUM CHLORIDE 40 MEQ: 29.8 INJECTION, SOLUTION INTRAVENOUS at 04:05

## 2025-05-10 RX ADMIN — HYDRALAZINE HYDROCHLORIDE 10 MG: 20 INJECTION, SOLUTION INTRAMUSCULAR; INTRAVENOUS at 12:05

## 2025-05-10 RX ADMIN — ACETAMINOPHEN 1000 MG: 500 TABLET ORAL at 06:05

## 2025-05-10 RX ADMIN — LIDOCAINE 2 PATCH: 50 PATCH CUTANEOUS at 05:05

## 2025-05-10 RX ADMIN — ACETAMINOPHEN 1000 MG: 500 TABLET ORAL at 12:05

## 2025-05-10 RX ADMIN — ENOXAPARIN SODIUM 40 MG: 40 INJECTION SUBCUTANEOUS at 05:05

## 2025-05-10 RX ADMIN — ACETAMINOPHEN 1000 MG: 500 TABLET ORAL at 10:05

## 2025-05-10 RX ADMIN — SODIUM PHOSPHATE, MONOBASIC, MONOHYDRATE AND SODIUM PHOSPHATE, DIBASIC, ANHYDROUS 30 MMOL: 142; 276 INJECTION, SOLUTION INTRAVENOUS at 04:05

## 2025-05-10 RX ADMIN — MAGNESIUM SULFATE HEPTAHYDRATE 2 G: 40 INJECTION, SOLUTION INTRAVENOUS at 04:05

## 2025-05-10 RX ADMIN — Medication: at 10:05

## 2025-05-10 RX ADMIN — AMLODIPINE BESYLATE 10 MG: 10 TABLET ORAL at 12:05

## 2025-05-10 RX ADMIN — FAMOTIDINE 20 MG: 10 INJECTION, SOLUTION INTRAVENOUS at 10:05

## 2025-05-10 RX ADMIN — NICARDIPINE HYDROCHLORIDE 5 MG/HR: 0.2 INJECTION, SOLUTION INTRAVENOUS at 05:05

## 2025-05-10 RX ADMIN — HEPARIN SODIUM 5000 UNITS: 5000 INJECTION INTRAVENOUS; SUBCUTANEOUS at 06:05

## 2025-05-10 RX ADMIN — MUPIROCIN: 20 OINTMENT TOPICAL at 09:05

## 2025-05-10 RX ADMIN — FOLIC ACID 1 MG: 1 TABLET ORAL at 10:05

## 2025-05-10 RX ADMIN — NICARDIPINE HYDROCHLORIDE 5 MG/HR: 0.2 INJECTION, SOLUTION INTRAVENOUS at 06:05

## 2025-05-10 RX ADMIN — ASPIRIN 81 MG: 81 TABLET, COATED ORAL at 10:05

## 2025-05-10 NOTE — ASSESSMENT & PLAN NOTE
Neuro/Psych:     - Sedation: none   - Scheduled Tylenol 1000mg Q8H   - PRN PCA; will plan to switch to oral tomorrow                Cardiac:     - S/p AAA open repair (16x8 bifurcated dacron graft) with Dr. Rhodes.     - BP Goal: -160    - Inotropes/Pressors: none    - Cardene drip    - Rhythm: NSR    - lactic acid resolved    - echo      Pulmonary:     - Goal SpO2 >92%    - IS, ambulate, pulm toilet         Renal:    - Trend BUN/Cr     - dc billy, juan strict I/Os    Recent Labs   Lab 05/09/25  2021 05/10/25  0302 05/10/25  1351   BUN 6 5* 4*   CREATININE 0.6 0.6 0.5           FEN / GI:     - Holding fluids for now; making good UOP will add fluids if UOP decreases      - Daily CMP, PRN K/Mag/Phos per protocol     - Replace electrolytes as needed    - Nutrition: NPO until ROBF    - Bowel Regimen: Miralax, docusate      ID:     - Aebrile    Recent Labs   Lab 05/08/25 2329 05/09/25  0742 05/10/25  0302   WBC 15.46* 14.56* 12.89*           Heme/Onc:     - Hgb 15.3 pre-operatively    - CBC Q8H     - Will start DVT ppx today    Recent Labs   Lab 05/08/25  0553 05/08/25  1207 05/08/25  1428 05/08/25  1446 05/08/25 2037 05/08/25 2329 05/09/25  0742 05/10/25  0302   HGB  --  11.3*   < >  --    < > 11.1* 10.0* 9.8*   PLT  --  147*   < >  --    < > 154 138* 140*   APTT  --  27.1  --  26.9  --   --   --   --    INR 1.1 1.3*  --  1.1  --   --   --   --     < > = values in this interval not displayed.           Endocrine:     - CTS Goal -140        PPx:   Feeding: NPO, will advance as tolerated following extubation   Analgesia/Sedation: Tylenol 1000mg Q8H, PCA  Thromboembolic Prevention: SCDs, lovenox   HOB >30: Yes  Stress Ulcer: not indicated  Glucose Control: No      Lines/Drains/Airway:   Art Line: right radial    Central Line: right IJ trialysis   NGT            Dispo/Code Status/Palliative:     - Continue SICU Care    - Full Code

## 2025-05-10 NOTE — PROGRESS NOTES
[facility]  Vascular Surgery  Progress Note    Patient Name: Dirk King  MRN: [id number]  Admission Date: 5/8/2025  Primary Care Provider: Daysi, Primary Doctor    Subjective:     Interval History: POD 2 AAA repair. Pt extubated yesterday, doing well. Palpable pedal pulses present on exam. Continues to be tachy but vitals otherwise stable. On IVF.     Post-Op Info:  Procedure(s) (LRB):  REPAIR-ANEURYSM-ABDOMINAL-AORTIC (AAA) class a @0619 (N/A)   2 Days Post-Op     Medications:  Continuous Infusions:   hydromorphone in 0.9 % NaCl 6 mg/30 ml   Intravenous Continuous   Rate Change at 05/10/25 1136    nicardipine  0-15 mg/hr Intravenous Continuous 50 mL/hr at 05/10/25 1001 10 mg/hr at 05/10/25 1001     Scheduled Meds:   acetaminophen  1,000 mg Per NG tube Q8H    amLODIPine  10 mg Per NG tube Daily    aspirin  81 mg Oral Daily    enoxparin  40 mg Subcutaneous Q24H (prophylaxis, 1700)    folic acid  1 mg Per OG tube Daily    [START ON 5/11/2025] thiamine  100 mg Per NG tube Daily     PRN Meds:  Current Facility-Administered Medications:     0.9%  NaCl infusion (for blood administration), , Intravenous, Q24H PRN    calcium gluconate IVPB, 1 g, Intravenous, PRN    calcium gluconate IVPB, 2 g, Intravenous, PRN    calcium gluconate IVPB, 3 g, Intravenous, PRN    docusate sodium, 50 mg, Oral, BID PRN    hydrALAZINE, 10 mg, Intravenous, Q4H PRN    magnesium sulfate 2 g IVPB, 2 g, Intravenous, PRN    magnesium sulfate 2 g IVPB, 2 g, Intravenous, PRN    naloxone, 0.02 mg, Intravenous, PRN    polyethylene glycol, 17 g, Oral, Daily PRN    potassium chloride in water, 40 mEq, Intravenous, PRN **AND** potassium chloride in water, 40 mEq, Intravenous, PRN **AND** potassium chloride in water, 40 mEq, Intravenous, PRN    sodium phosphate 20.1 mmol in D5W 250 mL IVPB, 20.1 mmol, Intravenous, PRN    sodium phosphate 30 mmol in D5W 250 mL IVPB, 30 mmol, Intravenous, PRN     Objective:     Vital Signs (Most  Recent):  Temp: 98.1 °F (36.7 °C) (05/10/25 0700)  Pulse: (!) 114 (05/10/25 1130)  Resp: 14 (05/10/25 1136)  BP: 115/74 (05/10/25 1130)  SpO2: 96 % (05/10/25 1130) Vital Signs (24h Range):  Temp:  [98.1 °F (36.7 °C)-98.7 °F (37.1 °C)] 98.1 °F (36.7 °C)  Pulse:  [] 114  Resp:  [7-33] 14  SpO2:  [93 %-100 %] 96 %  BP: (100-142)/(61-88) 115/74  Arterial Line BP: (108-157)/(57-83) 116/60     Date 05/10/25 0700 - 05/11/25 0659   Shift 1646-6428 2864-4937 9904-2056 24 Hour Total   INTAKE   I.V.(mL/kg) 618.8(6.8)   618.8(6.8)   IV Piggyback 248.4   248.4   Shift Total(mL/kg) 867.2(9.6)   867.2(9.6)   OUTPUT   Urine(mL/kg/hr) 1900   1900   Drains 150   150   Shift Total(mL/kg) 2050(22.6)   2050(22.6)   Weight (kg) 90.7 90.7 90.7 90.7        Physical Exam  Vitals reviewed.   Constitutional:       Comments: Intubated   HENT:      Head: Normocephalic and atraumatic.      Comments: OG tube in place, R nostril     Nose: Nose normal.   Neck:      Comments: Trialysis catheter in right IJ.  Cardiovascular:      Rate and Rhythm: Normal rate and regular rhythm.      Pulses: Normal pulses.      Comments: L DP 2+, R PT and DP signals  R radial luh  Pulmonary:      Comments: Room air  Abdominal:      Comments: Midline laparotomy incision bandaged, clean/dry   Genitourinary:     Comments: Bunch   Skin:     General: Skin is warm and dry.   Neurological:      Comments: Alert, oriented          Significant Labs:  CBC:   Recent Labs   Lab 05/10/25  0302   WBC 12.89*   RBC 3.31*   HGB 9.8*   HCT 29.7*   *   MCV 90   MCH 29.6   MCHC 33.0     CMP:   Recent Labs   Lab 05/10/25  0302   GLU 82   CALCIUM 7.8*   ALBUMIN 2.3*   PROT 5.1*      K 3.8   CO2 25      BUN 5*   CREATININE 0.6   ALKPHOS 52   ALT 16   AST 41   BILITOT 1.7*       Significant Diagnostics:  I have reviewed all pertinent imaging results/findings within the past 24 hours.  Assessment/Plan:     * Ruptured infrarenal abdominal aortic aneurysm  (AAA)  44-year-old gentleman presenting with 5 days of severe back pain, imaging concerning for symptomatic AAA due to connective tissue disorder.     POD1 s/p urgent open repair of ruptured AAA with 16x8 bifurcated dacron graft. Awake and intubated in NAD. Pulses L DP 2+, R PT and DP signals.     - Doing well after extubation  - C/w ASA  - Discontinue IVF  - Recommend echo  - Appreciate SICU care        Aditya Cordova MD  Vascular Surgery  [facility]

## 2025-05-10 NOTE — EICU
Intervention Initiated From:  [facility] / EICU    Oniel intervened regarding:  Rounding (Video assessment)    Comments: Virtual ICU Quality Rounds    Admit Date: 5/8/2025  Hospital Day: 2    ICU Day: 1d 23h    24H Vital Sign Range:  Temp:  [98.1 °F (36.7 °C)-98.7 °F (37.1 °C)]   Pulse:  []   Resp:  [7-34]   BP: (100-142)/(61-88)   SpO2:  [93 %-100 %]   Arterial Line BP: (108-157)/(57-83)     VICU Surveillance Screening    Daily review of  line necessity(optional): Central line(s) in place    Central line type (optional): Dialysis(non-tunneled)    LDA reconciliation : Yes

## 2025-05-10 NOTE — PLAN OF CARE
[facility] Staff Addendum  I have reviewed and concur with the resident's/NP's history, physical, assessment, and plan.  I have personally interviewed and examined the patient at bedside.  See below for any additional findings/changes.    Reason for admission:  Ruptured infrarenal abdominal aortic aneurysm (AAA)  Present on Admission:   Ruptured infrarenal abdominal aortic aneurysm (AAA)      Active issues/Goals for Today:     POD 2 s/p urgent open repair of ruptured AAA with infra-renal crossclamp. Intra-op course notable for extensive fluid and blood products resuscitation. He was extubated on 5/9. He is doing well.     - Pain control with PCA and tylenol ATC.  - BP control. Aiming for  to 160 as per vascular surgery Currently on cardene drip. Starting po amlodipine.  - Wean supplemental oxygen as able. IS.  - NGT as per primary.  - Monitor UOP and aim for > 0.5 cc/kg/hour   - Monitor Hgb and aim for Hgb > 7   - DVT ppx with lovenox.    36 minutes of critical care time was spent personally by me on the following activities: development of treatment plan with patient or surrogate and bedside caregivers, discussions with consultants, evaluation of patient's response to treatment, examination of patient, ordering and performing treatments and interventions, ordering and review of laboratory studies, ordering and review of radiographic studies, pulse oximetry, re-evaluation of patient's condition.  This critical care time did not overlap with that of any other provider or involve time for any procedures.    Silas Wetzel MD  Anesthesiology/Critical Care

## 2025-05-10 NOTE — ASSESSMENT & PLAN NOTE
44-year-old gentleman presenting with 5 days of severe back pain, imaging concerning for symptomatic AAA due to connective tissue disorder.     POD1 s/p urgent open repair of ruptured AAA with 16x8 bifurcated dacron graft. Awake and intubated in NAD. Pulses L DP 2+, R PT and DP signals.     - Doing well after extubation  - C/w ASA  - Discontinue IVF  - Recommend echo  - Appreciate SICU care

## 2025-05-10 NOTE — SUBJECTIVE & OBJECTIVE
Medications:  Continuous Infusions:   hydromorphone in 0.9 % NaCl 6 mg/30 ml   Intravenous Continuous   Rate Change at 05/10/25 1136    nicardipine  0-15 mg/hr Intravenous Continuous 50 mL/hr at 05/10/25 1001 10 mg/hr at 05/10/25 1001     Scheduled Meds:   acetaminophen  1,000 mg Per NG tube Q8H    amLODIPine  10 mg Per NG tube Daily    aspirin  81 mg Oral Daily    enoxparin  40 mg Subcutaneous Q24H (prophylaxis, 1700)    folic acid  1 mg Per OG tube Daily    [START ON 5/11/2025] thiamine  100 mg Per NG tube Daily     PRN Meds:  Current Facility-Administered Medications:     0.9%  NaCl infusion (for blood administration), , Intravenous, Q24H PRN    calcium gluconate IVPB, 1 g, Intravenous, PRN    calcium gluconate IVPB, 2 g, Intravenous, PRN    calcium gluconate IVPB, 3 g, Intravenous, PRN    docusate sodium, 50 mg, Oral, BID PRN    hydrALAZINE, 10 mg, Intravenous, Q4H PRN    magnesium sulfate 2 g IVPB, 2 g, Intravenous, PRN    magnesium sulfate 2 g IVPB, 2 g, Intravenous, PRN    naloxone, 0.02 mg, Intravenous, PRN    polyethylene glycol, 17 g, Oral, Daily PRN    potassium chloride in water, 40 mEq, Intravenous, PRN **AND** potassium chloride in water, 40 mEq, Intravenous, PRN **AND** potassium chloride in water, 40 mEq, Intravenous, PRN    sodium phosphate 20.1 mmol in D5W 250 mL IVPB, 20.1 mmol, Intravenous, PRN    sodium phosphate 30 mmol in D5W 250 mL IVPB, 30 mmol, Intravenous, PRN     Objective:     Vital Signs (Most Recent):  Temp: 98.1 °F (36.7 °C) (05/10/25 0700)  Pulse: (!) 114 (05/10/25 1130)  Resp: 14 (05/10/25 1136)  BP: 115/74 (05/10/25 1130)  SpO2: 96 % (05/10/25 1130) Vital Signs (24h Range):  Temp:  [98.1 °F (36.7 °C)-98.7 °F (37.1 °C)] 98.1 °F (36.7 °C)  Pulse:  [] 114  Resp:  [7-33] 14  SpO2:  [93 %-100 %] 96 %  BP: (100-142)/(61-88) 115/74  Arterial Line BP: (108-157)/(57-83) 116/60     Date 05/10/25 0700 - 05/11/25 0659   Shift 2040-7800 5203-6375 4189-2316 24 Hour Total   INTAKE    I.V.(mL/kg) 618.8(6.8)   618.8(6.8)   IV Piggyback 248.4   248.4   Shift Total(mL/kg) 867.2(9.6)   867.2(9.6)   OUTPUT   Urine(mL/kg/hr) 1900   1900   Drains 150   150   Shift Total(mL/kg) 2050(22.6)   2050(22.6)   Weight (kg) 90.7 90.7 90.7 90.7        Physical Exam  Vitals reviewed.   Constitutional:       Comments: Intubated   HENT:      Head: Normocephalic and atraumatic.      Comments: OG tube in place, R nostril     Nose: Nose normal.   Neck:      Comments: Trialysis catheter in right IJ.  Cardiovascular:      Rate and Rhythm: Normal rate and regular rhythm.      Pulses: Normal pulses.      Comments: L DP 2+, R PT and DP signals  R radial luh  Pulmonary:      Comments: Room air  Abdominal:      Comments: Midline laparotomy incision bandaged, clean/dry   Genitourinary:     Comments: Bunch   Skin:     General: Skin is warm and dry.   Neurological:      Comments: Alert, oriented          Significant Labs:  CBC:   Recent Labs   Lab 05/10/25  0302   WBC 12.89*   RBC 3.31*   HGB 9.8*   HCT 29.7*   *   MCV 90   MCH 29.6   MCHC 33.0     CMP:   Recent Labs   Lab 05/10/25  0302   GLU 82   CALCIUM 7.8*   ALBUMIN 2.3*   PROT 5.1*      K 3.8   CO2 25      BUN 5*   CREATININE 0.6   ALKPHOS 52   ALT 16   AST 41   BILITOT 1.7*       Significant Diagnostics:  I have reviewed all pertinent imaging results/findings within the past 24 hours.

## 2025-05-10 NOTE — PLAN OF CARE
Problem: Adult Inpatient Plan of Care  Goal: Plan of Care Review  5/10/2025 0451 by Nemo Gomez RN  Outcome: Progressing  5/10/2025 0324 by Nemo Gomez RN  Outcome: Progressing  Goal: Patient-Specific Goal (Individualized)  5/10/2025 0451 by Nemo Gomez RN  Outcome: Progressing  5/10/2025 0324 by Nemo Gomez RN  Outcome: Progressing  Goal: Absence of Hospital-Acquired Illness or Injury  5/10/2025 0451 by Nemo Gomez RN  Outcome: Progressing  5/10/2025 0324 by Nemo Gomez RN  Outcome: Progressing  Goal: Optimal Comfort and Wellbeing  5/10/2025 0451 by Nemo Gomez RN  Outcome: Progressing  5/10/2025 0324 by Nemo Gomez RN  Outcome: Progressing  Goal: Readiness for Transition of Care  5/10/2025 0451 by Nemo Gomez RN  Outcome: Progressing  5/10/2025 0324 by Nemo Gomez RN  Outcome: Progressing

## 2025-05-10 NOTE — EICU
VICU Night Rounds Checklist  24H Vital Sign Range:  Temp:  [97.9 °F (36.6 °C)-98.7 °F (37.1 °C)]   Pulse:  [63-98]   Resp:  [8-29]   BP: ()/(50-86)   SpO2:  [96 %-100 %]   Arterial Line BP: ()/(43-83)     Video rounds and LDA reconciliation

## 2025-05-10 NOTE — SUBJECTIVE & OBJECTIVE
Interval History/Significant Events:   Extubated yesterday doing well this morning. HTN with MAP > 90 started cardene drip.  Complains of pain on PCA got approx 2.5 mg total.     Follow-up For: Procedure(s) (LRB):  REPAIR-ANEURYSM-ABDOMINAL-AORTIC (AAA) class a @0619 (N/A)    Post-Operative Day: 2 Days Post-Op    Objective:     Vital Signs (Most Recent):  Temp: 98.1 °F (36.7 °C) (05/10/25 0700)  Pulse: (!) 116 (05/10/25 0715)  Resp: 16 (05/10/25 0806)  BP: (!) 140/88 (05/10/25 0701)  SpO2: 100 % (05/10/25 0715) Vital Signs (24h Range):  Temp:  [98.1 °F (36.7 °C)-98.7 °F (37.1 °C)] 98.1 °F (36.7 °C)  Pulse:  [] 116  Resp:  [8-33] 16  SpO2:  [96 %-100 %] 100 %  BP: ()/(52-88) 140/88  Arterial Line BP: (101-157)/(50-83) 151/75     Weight: 90.7 kg (200 lb)  Body mass index is 31.32 kg/m².      Intake/Output Summary (Last 24 hours) at 5/10/2025 1001  Last data filed at 5/10/2025 0801  Gross per 24 hour   Intake 2706.94 ml   Output 5000 ml   Net -2293.06 ml          Physical Exam  HENT:      Head: Normocephalic and atraumatic.      Comments: NGT to LIWS     Nose: Nose normal.   Neck:      Comments: Trialysis catheter in right IJ.  Cardiovascular:      Rate and Rhythm: Regular rhythm. Tachycardia present.      Pulses: Normal pulses.      Comments: 1+ DP pulses bilaterally  Right radial arterial line in place  Pulmonary:      Effort: Pulmonary effort is normal.   Abdominal:      Comments: Midline laparotomy incision bandaged, clean/dry   Genitourinary:     Comments: Bunch catheter in place  Skin:     General: Skin is warm and dry.   Neurological:      General: No focal deficit present.      Mental Status: He is alert and oriented to person, place, and time.            Vents:  Vent Mode: Spont (05/09/25 1300)  Ventilator Initiated: Yes (05/08/25 1358)  Set Rate: 16 BPM (05/09/25 1200)  Vt Set: 430 mL (05/09/25 1200)  Pressure Support: 5 cmH20 (05/09/25 1300)  PEEP/CPAP: 5 cmH20 (05/09/25 1300)  Oxygen  Concentration (%): 40 (05/09/25 1300)  Peak Airway Pressure: 21 cmH20 (05/09/25 1300)  Plateau Pressure: 0 cmH20 (05/09/25 1300)  Total Ve: 0 L/m (05/09/25 1300)  Negative Inspiratory Force (cm H2O): 0 (05/09/25 1300)  F/VT Ratio<105 (RSBI): (!) 19.55 (05/09/25 1200)    Lines/Drains/Airways       Central Venous Catheter Line  Duration                  Hemodialysis Catheter 05/08/25 0822 right internal jugular 2 days              Drain  Duration                  Urethral Catheter 05/08/25 0717 Straight-tip;Non-latex 16 Fr. 2 days         NG/OG Tube 05/09/25 1214 Right nostril <1 day              Arterial Line  Duration             Arterial Line 05/08/25 0818 Right Radial 2 days              Peripheral Intravenous Line  Duration                  Peripheral IV - Single Lumen 05/08/25 0231 20 G 1 in Anterior;Right Upper Arm 2 days         Peripheral IV - Single Lumen 05/08/25 0601 18 G 1 1/4 in Anterior;Left Upper Arm 2 days         Peripheral IV - Single Lumen 05/08/25 0712 14 G  1 1/4 in Right Forearm 2 days         Peripheral IV - Single Lumen 05/08/25 0735 14 G  1 1/4 in Left Forearm 2 days                    Significant Labs:    CBC/Anemia Profile:  Recent Labs   Lab 05/08/25 2329 05/09/25  0742 05/10/25  0302   WBC 15.46* 14.56* 12.89*   HGB 11.1* 10.0* 9.8*   HCT 33.4* 30.3* 29.7*    138* 140*   MCV 88 88 90   RDW 14.3 14.5 13.9        Chemistries:  Recent Labs   Lab 05/08/25  1428 05/08/25 2037 05/09/25  0313 05/09/25  0742 05/09/25  2021 05/10/25  0302   *   < >  --  138 139 137   K 4.4   < >  --  4.1 3.6 3.8      < >  --  109 106 104   CO2 21*   < >  --  25 25 25   BUN 8   < >  --  10 6 5*   CREATININE 0.6   < >  --  0.7 0.6 0.6   CALCIUM 9.1   < >  --  7.4* 7.4* 7.8*   ALBUMIN 2.3*   < >  --  2.0* 2.4* 2.3*   PROT 4.6*   < >  --  4.3* 4.9* 5.1*   BILITOT 4.3*   < >  --  2.2* 1.9* 1.7*   ALKPHOS 44   < >  --  50 52 52   ALT 18   < >  --  19 16 16   AST 31   < >  --  48* 43 41   MG 1.5*  --   1.4*  --   --  1.7   PHOS 3.2  --  3.2  --   --  1.4*    < > = values in this interval not displayed.       All pertinent labs within the past 24 hours have been reviewed.    Significant Imaging:  I have reviewed all pertinent imaging results/findings within the past 24 hours.   Universal Safety Interventions

## 2025-05-10 NOTE — PROGRESS NOTES
[facility] - Surgical Intensive Care  Critical Care - Surgery  Progress Note    Patient Name: Dirk King  MRN: [id number]  Admission Date: 5/8/2025  Hospital Length of Stay: 2 days  Code Status: No Order  Attending Provider: JESSICA Rhodes III, MD  Primary Care Provider: No, Primary Doctor   Principal Problem: Ruptured infrarenal abdominal aortic aneurysm (AAA)    Subjective:     Hospital/ICU Course:  No notes on file    Interval History/Significant Events:   Extubated yesterday doing well this morning. HTN with MAP > 90 started cardene drip.  Complains of pain on PCA got approx 2.5 mg total.     Follow-up For: Procedure(s) (LRB):  REPAIR-ANEURYSM-ABDOMINAL-AORTIC (AAA) class a @0619 (N/A)    Post-Operative Day: 2 Days Post-Op    Objective:     Vital Signs (Most Recent):  Temp: 98.1 °F (36.7 °C) (05/10/25 0700)  Pulse: (!) 116 (05/10/25 0715)  Resp: 16 (05/10/25 0806)  BP: (!) 140/88 (05/10/25 0701)  SpO2: 100 % (05/10/25 0715) Vital Signs (24h Range):  Temp:  [98.1 °F (36.7 °C)-98.7 °F (37.1 °C)] 98.1 °F (36.7 °C)  Pulse:  [] 116  Resp:  [8-33] 16  SpO2:  [96 %-100 %] 100 %  BP: ()/(52-88) 140/88  Arterial Line BP: (101-157)/(50-83) 151/75     Weight: 90.7 kg (200 lb)  Body mass index is 31.32 kg/m².      Intake/Output Summary (Last 24 hours) at 5/10/2025 1001  Last data filed at 5/10/2025 0801  Gross per 24 hour   Intake 2706.94 ml   Output 5000 ml   Net -2293.06 ml          Physical Exam  HENT:      Head: Normocephalic and atraumatic.      Comments: NGT to LIWS     Nose: Nose normal.   Neck:      Comments: Trialysis catheter in right IJ.  Cardiovascular:      Rate and Rhythm: Regular rhythm. Tachycardia present.      Pulses: Normal pulses.      Comments: 1+ DP pulses bilaterally  Right radial arterial line in place  Pulmonary:      Effort: Pulmonary effort is normal.   Abdominal:      Comments: Midline laparotomy incision bandaged, clean/dry   Genitourinary:     Comments: Bunch catheter in  place  Skin:     General: Skin is warm and dry.   Neurological:      General: No focal deficit present.      Mental Status: He is alert and oriented to person, place, and time.            Vents:  Vent Mode: Spont (05/09/25 1300)  Ventilator Initiated: Yes (05/08/25 1358)  Set Rate: 16 BPM (05/09/25 1200)  Vt Set: 430 mL (05/09/25 1200)  Pressure Support: 5 cmH20 (05/09/25 1300)  PEEP/CPAP: 5 cmH20 (05/09/25 1300)  Oxygen Concentration (%): 40 (05/09/25 1300)  Peak Airway Pressure: 21 cmH20 (05/09/25 1300)  Plateau Pressure: 0 cmH20 (05/09/25 1300)  Total Ve: 0 L/m (05/09/25 1300)  Negative Inspiratory Force (cm H2O): 0 (05/09/25 1300)  F/VT Ratio<105 (RSBI): (!) 19.55 (05/09/25 1200)    Lines/Drains/Airways       Central Venous Catheter Line  Duration                  Hemodialysis Catheter 05/08/25 0822 right internal jugular 2 days              Drain  Duration                  Urethral Catheter 05/08/25 0717 Straight-tip;Non-latex 16 Fr. 2 days         NG/OG Tube 05/09/25 1214 Right nostril <1 day              Arterial Line  Duration             Arterial Line 05/08/25 0818 Right Radial 2 days              Peripheral Intravenous Line  Duration                  Peripheral IV - Single Lumen 05/08/25 0231 20 G 1 in Anterior;Right Upper Arm 2 days         Peripheral IV - Single Lumen 05/08/25 0601 18 G 1 1/4 in Anterior;Left Upper Arm 2 days         Peripheral IV - Single Lumen 05/08/25 0712 14 G  1 1/4 in Right Forearm 2 days         Peripheral IV - Single Lumen 05/08/25 0735 14 G  1 1/4 in Left Forearm 2 days                    Significant Labs:    CBC/Anemia Profile:  Recent Labs   Lab 05/08/25  2329 05/09/25  0742 05/10/25  0302   WBC 15.46* 14.56* 12.89*   HGB 11.1* 10.0* 9.8*   HCT 33.4* 30.3* 29.7*    138* 140*   MCV 88 88 90   RDW 14.3 14.5 13.9        Chemistries:  Recent Labs   Lab 05/08/25  1428 05/08/25 2037 05/09/25  0313 05/09/25  0742 05/09/25  2021 05/10/25  0302   *   < >  --  138 139 137    K 4.4   < >  --  4.1 3.6 3.8      < >  --  109 106 104   CO2 21*   < >  --  25 25 25   BUN 8   < >  --  10 6 5*   CREATININE 0.6   < >  --  0.7 0.6 0.6   CALCIUM 9.1   < >  --  7.4* 7.4* 7.8*   ALBUMIN 2.3*   < >  --  2.0* 2.4* 2.3*   PROT 4.6*   < >  --  4.3* 4.9* 5.1*   BILITOT 4.3*   < >  --  2.2* 1.9* 1.7*   ALKPHOS 44   < >  --  50 52 52   ALT 18   < >  --  19 16 16   AST 31   < >  --  48* 43 41   MG 1.5*  --  1.4*  --   --  1.7   PHOS 3.2  --  3.2  --   --  1.4*    < > = values in this interval not displayed.       All pertinent labs within the past 24 hours have been reviewed.    Significant Imaging:  I have reviewed all pertinent imaging results/findings within the past 24 hours.  Assessment/Plan:     Cardiac/Vascular  * Ruptured infrarenal abdominal aortic aneurysm (AAA)    Neuro/Psych:     - Sedation: none   - Scheduled Tylenol 1000mg Q8H   - PRN PCA; will plan to switch to oral tomorrow                Cardiac:     - S/p AAA open repair (16x8 bifurcated dacron graft) with Dr. Rhodes.     - BP Goal: -160    - Inotropes/Pressors: none    - Cardene drip    - Rhythm: NSR    - lactic acid resolved    - echo      Pulmonary:     - Goal SpO2 >92%    - IS, ambulate, pulm toilet         Renal:    - Trend BUN/Cr     - juan campbell strict I/Os    Recent Labs   Lab 05/09/25  2021 05/10/25  0302 05/10/25  1351   BUN 6 5* 4*   CREATININE 0.6 0.6 0.5           FEN / GI:     - Holding fluids for now; making good UOP will add fluids if UOP decreases      - Daily CMP, PRN K/Mag/Phos per protocol     - Replace electrolytes as needed    - Nutrition: NPO until ROBF    - Bowel Regimen: Miralax, docusate      ID:     - Aebrile    Recent Labs   Lab 05/08/25  2329 05/09/25  0742 05/10/25  0302   WBC 15.46* 14.56* 12.89*           Heme/Onc:     - Hgb 15.3 pre-operatively    - CBC Q8H     - Will start DVT ppx today    Recent Labs   Lab 05/08/25  0553 05/08/25  1207 05/08/25  1428 05/08/25  1446 05/08/25 2037  05/08/25  2329 05/09/25  0742 05/10/25  0302   HGB  --  11.3*   < >  --    < > 11.1* 10.0* 9.8*   PLT  --  147*   < >  --    < > 154 138* 140*   APTT  --  27.1  --  26.9  --   --   --   --    INR 1.1 1.3*  --  1.1  --   --   --   --     < > = values in this interval not displayed.           Endocrine:     - CTS Goal -140        PPx:   Feeding: NPO, will advance as tolerated following extubation   Analgesia/Sedation: Tylenol 1000mg Q8H, PCA  Thromboembolic Prevention: SCDs, lovenox   HOB >30: Yes  Stress Ulcer: not indicated  Glucose Control: No      Lines/Drains/Airway:   Art Line: right radial    Central Line: right IJ trialysis   NGT            Dispo/Code Status/Palliative:     - Continue SICU Care    - Full Code          Brittany Haq MD  Critical Care - Surgery  [facility]

## 2025-05-10 NOTE — PLAN OF CARE
SICU PLAN OF CARE    Dx: Ruptured infrarenal abdominal aortic aneurysm (AAA)    Goals of Care: MAP <80, Ambulate and out of bed as much as possible. Convert to PO pain medication tomorrow.     Vital Signs (last 12 hours):   Temp:  [97.8 °F (36.6 °C)-98.1 °F (36.7 °C)]   Pulse:  []   Resp:  [9-40]   BP: (110-140)/(67-88)   SpO2:  [93 %-100 %]   Arterial Line BP: (105-143)/(57-76)      Neuro: AAOx4    Cardiac: Rhythm: sinus tachycardia    Respiratory: 2L Nasal Cannula     Gtts: MIVF and Cardene    Urine Output: Voids Spontaneously  2675 mL/shift      Drains: NG/OG Tube, total output 400mL/shift    Diet: NPO      Labs/Accuchecks: BMP Q12    Skin:  Incision from surgery mid abdomen.  Patient turned q2h, bony prominences protected, and mattress inflated/working correctly.     Shift Events:  Up to chair most of shift, cervantes removed PCA adjusted.  See flowsheet for further assessment/details.  Family updated on current condition/plan of care, questions answered, and emotional support provided.  MD updated on current condition, vitals, labs, and gtts.

## 2025-05-11 LAB
ABSOLUTE EOSINOPHIL (OHS): 0.07 K/UL
ABSOLUTE MONOCYTE (OHS): 1.2 K/UL (ref 0.3–1)
ABSOLUTE NEUTROPHIL COUNT (OHS): 10.62 K/UL (ref 1.8–7.7)
ALBUMIN SERPL BCP-MCNC: 2.5 G/DL (ref 3.5–5.2)
ALP SERPL-CCNC: 64 UNIT/L (ref 40–150)
ALT SERPL W/O P-5'-P-CCNC: 17 UNIT/L (ref 10–44)
ANION GAP (OHS): 11 MMOL/L (ref 8–16)
AST SERPL-CCNC: 36 UNIT/L (ref 11–45)
BASOPHILS # BLD AUTO: 0.04 K/UL
BASOPHILS NFR BLD AUTO: 0.3 %
BILIRUB SERPL-MCNC: 1.6 MG/DL (ref 0.1–1)
BUN SERPL-MCNC: 6 MG/DL (ref 6–20)
CALCIUM SERPL-MCNC: 8.5 MG/DL (ref 8.7–10.5)
CHLORIDE SERPL-SCNC: 101 MMOL/L (ref 95–110)
CO2 SERPL-SCNC: 23 MMOL/L (ref 23–29)
CREAT SERPL-MCNC: 0.6 MG/DL (ref 0.5–1.4)
ERYTHROCYTE [DISTWIDTH] IN BLOOD BY AUTOMATED COUNT: 13.3 % (ref 11.5–14.5)
GFR SERPLBLD CREATININE-BSD FMLA CKD-EPI: >60 ML/MIN/1.73/M2
GLUCOSE SERPL-MCNC: 90 MG/DL (ref 70–110)
HCT VFR BLD AUTO: 34.7 % (ref 40–54)
HGB BLD-MCNC: 11.2 GM/DL (ref 14–18)
IMM GRANULOCYTES # BLD AUTO: 0.16 K/UL (ref 0–0.04)
IMM GRANULOCYTES NFR BLD AUTO: 1.2 % (ref 0–0.5)
LYMPHOCYTES # BLD AUTO: 1.51 K/UL (ref 1–4.8)
MAGNESIUM SERPL-MCNC: 1.8 MG/DL (ref 1.6–2.6)
MCH RBC QN AUTO: 28.6 PG (ref 27–31)
MCHC RBC AUTO-ENTMCNC: 32.3 G/DL (ref 32–36)
MCV RBC AUTO: 89 FL (ref 82–98)
NUCLEATED RBC (/100WBC) (OHS): 0 /100 WBC
PHOSPHATE SERPL-MCNC: 2.6 MG/DL (ref 2.7–4.5)
PLATELET # BLD AUTO: 231 K/UL (ref 150–450)
PLATELET BLD QL SMEAR: NORMAL
PMV BLD AUTO: 9.4 FL (ref 9.2–12.9)
POTASSIUM SERPL-SCNC: 3.9 MMOL/L (ref 3.5–5.1)
PROT SERPL-MCNC: 6.1 GM/DL (ref 6–8.4)
RBC # BLD AUTO: 3.91 M/UL (ref 4.6–6.2)
RELATIVE EOSINOPHIL (OHS): 0.5 %
RELATIVE LYMPHOCYTE (OHS): 11.1 % (ref 18–48)
RELATIVE MONOCYTE (OHS): 8.8 % (ref 4–15)
RELATIVE NEUTROPHIL (OHS): 78.1 % (ref 38–73)
SODIUM SERPL-SCNC: 135 MMOL/L (ref 136–145)
WBC # BLD AUTO: 13.6 K/UL (ref 3.9–12.7)

## 2025-05-11 PROCEDURE — 85025 COMPLETE CBC W/AUTO DIFF WBC: CPT

## 2025-05-11 PROCEDURE — 63600175 PHARM REV CODE 636 W HCPCS

## 2025-05-11 PROCEDURE — 25000003 PHARM REV CODE 250

## 2025-05-11 PROCEDURE — 25000003 PHARM REV CODE 250: Performed by: SURGERY

## 2025-05-11 PROCEDURE — 83735 ASSAY OF MAGNESIUM: CPT

## 2025-05-11 PROCEDURE — 20600001 HC STEP DOWN PRIVATE ROOM

## 2025-05-11 PROCEDURE — 99900035 HC TECH TIME PER 15 MIN (STAT)

## 2025-05-11 PROCEDURE — 84100 ASSAY OF PHOSPHORUS: CPT

## 2025-05-11 PROCEDURE — 99233 SBSQ HOSP IP/OBS HIGH 50: CPT | Mod: ,,, | Performed by: ANESTHESIOLOGY

## 2025-05-11 PROCEDURE — 82435 ASSAY OF BLOOD CHLORIDE: CPT

## 2025-05-11 RX ADMIN — ASPIRIN 81 MG: 81 TABLET, COATED ORAL at 08:05

## 2025-05-11 RX ADMIN — POTASSIUM CHLORIDE 40 MEQ: 29.8 INJECTION, SOLUTION INTRAVENOUS at 04:05

## 2025-05-11 RX ADMIN — LIDOCAINE 2 PATCH: 50 PATCH CUTANEOUS at 05:05

## 2025-05-11 RX ADMIN — MAGNESIUM SULFATE HEPTAHYDRATE 2 G: 40 INJECTION, SOLUTION INTRAVENOUS at 04:05

## 2025-05-11 RX ADMIN — MUPIROCIN: 20 OINTMENT TOPICAL at 08:05

## 2025-05-11 RX ADMIN — MUPIROCIN: 20 OINTMENT TOPICAL at 09:05

## 2025-05-11 RX ADMIN — AMLODIPINE BESYLATE 10 MG: 10 TABLET ORAL at 08:05

## 2025-05-11 RX ADMIN — ACETAMINOPHEN 1000 MG: 500 TABLET ORAL at 10:05

## 2025-05-11 RX ADMIN — ENOXAPARIN SODIUM 40 MG: 40 INJECTION SUBCUTANEOUS at 05:05

## 2025-05-11 RX ADMIN — ACETAMINOPHEN 1000 MG: 500 TABLET ORAL at 06:05

## 2025-05-11 RX ADMIN — Medication 100 MG: at 08:05

## 2025-05-11 RX ADMIN — FOLIC ACID 1 MG: 1 TABLET ORAL at 08:05

## 2025-05-11 NOTE — PROGRESS NOTES
NGT removed while pt positioning himself in bed. Team aware and order placed to have NGT replaced as well a CXR.

## 2025-05-11 NOTE — PLAN OF CARE
[facility] Staff Addendum  I have reviewed and concur with the resident's/NP's history, physical, assessment, and plan.  I have personally interviewed and examined the patient at bedside.  See below for any additional findings/changes.    Reason for admission:  Ruptured infrarenal abdominal aortic aneurysm (AAA)  Present on Admission:   Ruptured infrarenal abdominal aortic aneurysm (AAA)      Active issues/Goals for Today:     POD 3 s/p urgent open repair of ruptured AAA with infra-renal crossclamp. Intra-op course notable for extensive fluid and blood products resuscitation. He was extubated on 5/9. He is doing well. He is now off cardene.     - Pain control with PCA and tylenol ATC. Will increase lockout time.  - BP control. Aiming for  to 160 as per vascular surgery . Continue oral amlodipine.  - Wean supplemental oxygen as able. IS.  - NGT as per primary. Removing today.  - Monitor UOP and aim for > 0.5 cc/kg/hour   - Monitor Hgb and aim for Hgb > 7   - DVT ppx with lovenox.  - Will remove invasive lines.  - Stable for stepdown.    Silas Wetzel MD  Anesthesiology/Critical Care

## 2025-05-11 NOTE — PROGRESS NOTES
[facility] - Inpatient General Surgery  Critical Care - Surgery  Progress Note    Patient Name: Dirk King  MRN: [id number]  Admission Date: 5/8/2025  Hospital Length of Stay: 3 days  Code Status: No Order  Attending Provider: JESSICA Rhodes III, MD  Primary Care Provider: No, Primary Doctor   Principal Problem: Ruptured infrarenal abdominal aortic aneurysm (AAA)    Subjective:     Hospital/ICU Course:  No notes on file    Interval History/Significant Events: NGT replaced overnight. PCA decreased to 0.3 mg/hr. AF, on RA. 3.9L UOP, net negative 2.8L yesterday.    Follow-up For: Procedure(s) (LRB):  REPAIR-ANEURYSM-ABDOMINAL-AORTIC (AAA) class a @0619 (N/A)    Post-Operative Day: 3 Days Post-Op  Review of Systems   HENT: Negative.     Respiratory: Negative.     Cardiovascular:         Incisional pain   Gastrointestinal:  Positive for abdominal pain.   Musculoskeletal: Negative.    Neurological: Negative.          Objective:     Vital Signs (Most Recent):  Temp: 98.5 °F (36.9 °C) (05/11/25 1100)  Pulse: 101 (05/11/25 1115)  Resp: (!) 21 (05/11/25 1115)  BP: 122/83 (05/11/25 1100)  SpO2: 97 % (05/11/25 1115) Vital Signs (24h Range):  Temp:  [97.8 °F (36.6 °C)-98.5 °F (36.9 °C)] 98.5 °F (36.9 °C)  Pulse:  [] 101  Resp:  [0-40] 21  SpO2:  [96 %-100 %] 97 %  BP: (110-135)/(64-83) 122/83  Arterial Line BP: (112-149)/(57-81) 125/64     Weight: 90.7 kg (200 lb)  Body mass index is 31.32 kg/m².      Intake/Output Summary (Last 24 hours) at 5/11/2025 1355  Last data filed at 5/11/2025 0901  Gross per 24 hour   Intake 530.83 ml   Output 2550 ml   Net -2019.17 ml          Physical Exam  Vitals and nursing note reviewed.   HENT:      Head: Normocephalic and atraumatic.      Nose:      Comments: NGT to LIWS  Neck:      Comments: R IJ CVC  Cardiovascular:      Rate and Rhythm: Regular rhythm. Tachycardia present.   Pulmonary:      Effort: Pulmonary effort is normal. No respiratory distress.   Abdominal:      General:  There is no distension.      Comments: Midline laparotomy incision, c/d/i   Musculoskeletal:      Comments: R radial arterial line   Skin:     General: Skin is warm and dry.   Neurological:      General: No focal deficit present.      Mental Status: He is alert and oriented to person, place, and time.   Psychiatric:         Mood and Affect: Mood normal.         Behavior: Behavior normal.            Vents:  Vent Mode: Spont (05/09/25 1300)  Ventilator Initiated: Yes (05/08/25 1358)  Set Rate: 16 BPM (05/09/25 1200)  Vt Set: 430 mL (05/09/25 1200)  Pressure Support: 5 cmH20 (05/09/25 1300)  PEEP/CPAP: 5 cmH20 (05/09/25 1300)  Oxygen Concentration (%): 40 (05/09/25 1300)  Peak Airway Pressure: 21 cmH20 (05/09/25 1300)  Plateau Pressure: 0 cmH20 (05/09/25 1300)  Total Ve: 0 L/m (05/09/25 1300)  Negative Inspiratory Force (cm H2O): 0 (05/09/25 1300)  F/VT Ratio<105 (RSBI): (!) 19.55 (05/09/25 1200)    Lines/Drains/Airways       Peripheral Intravenous Line  Duration                  Peripheral IV - Single Lumen 05/08/25 0231 20 G 1 in Anterior;Right Upper Arm 3 days         Peripheral IV - Single Lumen 05/08/25 0601 18 G 1 1/4 in Anterior;Left Upper Arm 3 days         Peripheral IV - Single Lumen 05/08/25 0712 14 G  1 1/4 in Right Forearm 3 days         Peripheral IV - Single Lumen 05/08/25 0735 14 G  1 1/4 in Left Forearm 3 days                    Significant Labs:    CBC/Anemia Profile:  Recent Labs   Lab 05/10/25  0302 05/11/25  0301   WBC 12.89* 13.60*   HGB 9.8* 11.2*   HCT 29.7* 34.7*   * 231   MCV 90 89   RDW 13.9 13.3        Chemistries:  Recent Labs   Lab 05/09/25  2021 05/10/25  0302 05/10/25  1351 05/11/25  0301    137 133* 135*   K 3.6 3.8 3.9 3.9    104 101 101   CO2 25 25 25 23   BUN 6 5* 4* 6   CREATININE 0.6 0.6 0.5 0.6   CALCIUM 7.4* 7.8* 8.1* 8.5*   ALBUMIN 2.4* 2.3*  --  2.5*   PROT 4.9* 5.1*  --  6.1   BILITOT 1.9* 1.7*  --  1.6*   ALKPHOS 52 52  --  64   ALT 16 16  --  17   AST 43  41  --  36   MG  --  1.7  --  1.8   PHOS  --  1.4*  --  2.6*       All pertinent labs within the past 24 hours have been reviewed.    Significant Imaging:  I have reviewed all pertinent imaging results/findings within the past 24 hours.  Assessment/Plan:     Cardiac/Vascular  * Ruptured infrarenal abdominal aortic aneurysm (AAA)    Neuro/Psych:     - Sedation: none   - Scheduled Tylenol 1000mg Q8H   - PRN PCA - decreased demand bolus to 0.1mg q30min for max dose of 0.2mg/hr               Cardiac:     - S/p AAA open repair (16x8 bifurcated dacron graft) with Dr. Rhodes.     - BP Goal: -160    - Inotropes/Pressors: none    - Cardene drip off this morning    - Rhythm: NSR    - lactic acid resolved    - echo      Pulmonary:     - Goal SpO2 >92%    - IS, ambulate, pulm toilet         Renal:    - Trend BUN/Cr     - dc cervantes, record strict I/Os    Recent Labs   Lab 05/10/25  0302 05/10/25  1351 05/11/25  0301   BUN 5* 4* 6   CREATININE 0.6 0.5 0.6           FEN / GI:     - Holding fluids for now; making good UOP will add fluids if UOP decreases      - Daily CMP, PRN K/Mag/Phos per protocol     - Replace electrolytes as needed    - Nutrition: NPO until ROBF    - Bowel Regimen: Miralax, docusate      ID:     - Afebrile    Recent Labs   Lab 05/09/25  0742 05/10/25  0302 05/11/25  0301   WBC 14.56* 12.89* 13.60*           Heme/Onc:     - Hgb 15.3 pre-operatively    - CBC Q8H     - Will start DVT ppx today    Recent Labs   Lab 05/08/25  0553 05/08/25  1207 05/08/25  1428 05/08/25  1446 05/08/25  2037 05/09/25  0742 05/10/25  0302 05/11/25  0301   HGB  --  11.3*   < >  --    < > 10.0* 9.8* 11.2*   PLT  --  147*   < >  --    < > 138* 140* 231   APTT  --  27.1  --  26.9  --   --   --   --    INR 1.1 1.3*  --  1.1  --   --   --   --     < > = values in this interval not displayed.           Endocrine:     - CTS Goal -140        PPx:   Feeding: NPO, will advance as tolerated  Analgesia/Sedation: Tylenol 1000mg Q8H,  PCA  Thromboembolic Prevention: SCDs, lovenox   HOB >30: Yes  Stress Ulcer: not indicated  Glucose Control: No      Lines/Drains/Airway:   Art Line: right radial  -  removing   Central Line: right IJ trialysis - removing   NGT -  removing           Dispo/Code Status/Palliative:     - Stable to SD to floor    - Full Code          Socorro Coreas MD  Critical Care - Surgery  [facility] - In[facility] General [facility]

## 2025-05-11 NOTE — SUBJECTIVE & OBJECTIVE
Interval History/Significant Events: NGT replaced overnight. PCA decreased to 0.3 mg/hr. AF, on RA. 3.9L UOP, net negative 2.8L yesterday.    Follow-up For: Procedure(s) (LRB):  REPAIR-ANEURYSM-ABDOMINAL-AORTIC (AAA) class a @0619 (N/A)    Post-Operative Day: 3 Days Post-Op  Review of Systems   HENT: Negative.     Respiratory: Negative.     Cardiovascular:         Incisional pain   Gastrointestinal:  Positive for abdominal pain.   Musculoskeletal: Negative.    Neurological: Negative.          Objective:     Vital Signs (Most Recent):  Temp: 98.5 °F (36.9 °C) (05/11/25 1100)  Pulse: 101 (05/11/25 1115)  Resp: (!) 21 (05/11/25 1115)  BP: 122/83 (05/11/25 1100)  SpO2: 97 % (05/11/25 1115) Vital Signs (24h Range):  Temp:  [97.8 °F (36.6 °C)-98.5 °F (36.9 °C)] 98.5 °F (36.9 °C)  Pulse:  [] 101  Resp:  [0-40] 21  SpO2:  [96 %-100 %] 97 %  BP: (110-135)/(64-83) 122/83  Arterial Line BP: (112-149)/(57-81) 125/64     Weight: 90.7 kg (200 lb)  Body mass index is 31.32 kg/m².      Intake/Output Summary (Last 24 hours) at 5/11/2025 1355  Last data filed at 5/11/2025 0901  Gross per 24 hour   Intake 530.83 ml   Output 2550 ml   Net -2019.17 ml          Physical Exam  Vitals and nursing note reviewed.   HENT:      Head: Normocephalic and atraumatic.      Nose:      Comments: NGT to LIWS  Neck:      Comments: R IJ CVC  Cardiovascular:      Rate and Rhythm: Regular rhythm. Tachycardia present.   Pulmonary:      Effort: Pulmonary effort is normal. No respiratory distress.   Abdominal:      General: There is no distension.      Comments: Midline laparotomy incision, c/d/i   Musculoskeletal:      Comments: R radial arterial line   Skin:     General: Skin is warm and dry.   Neurological:      General: No focal deficit present.      Mental Status: He is alert and oriented to person, place, and time.   Psychiatric:         Mood and Affect: Mood normal.         Behavior: Behavior normal.            Vents:  Vent Mode: Spont (05/09/25  1300)  Ventilator Initiated: Yes (05/08/25 1358)  Set Rate: 16 BPM (05/09/25 1200)  Vt Set: 430 mL (05/09/25 1200)  Pressure Support: 5 cmH20 (05/09/25 1300)  PEEP/CPAP: 5 cmH20 (05/09/25 1300)  Oxygen Concentration (%): 40 (05/09/25 1300)  Peak Airway Pressure: 21 cmH20 (05/09/25 1300)  Plateau Pressure: 0 cmH20 (05/09/25 1300)  Total Ve: 0 L/m (05/09/25 1300)  Negative Inspiratory Force (cm H2O): 0 (05/09/25 1300)  F/VT Ratio<105 (RSBI): (!) 19.55 (05/09/25 1200)    Lines/Drains/Airways       Peripheral Intravenous Line  Duration                  Peripheral IV - Single Lumen 05/08/25 0231 20 G 1 in Anterior;Right Upper Arm 3 days         Peripheral IV - Single Lumen 05/08/25 0601 18 G 1 1/4 in Anterior;Left Upper Arm 3 days         Peripheral IV - Single Lumen 05/08/25 0712 14 G  1 1/4 in Right Forearm 3 days         Peripheral IV - Single Lumen 05/08/25 0735 14 G  1 1/4 in Left Forearm 3 days                    Significant Labs:    CBC/Anemia Profile:  Recent Labs   Lab 05/10/25  0302 05/11/25  0301   WBC 12.89* 13.60*   HGB 9.8* 11.2*   HCT 29.7* 34.7*   * 231   MCV 90 89   RDW 13.9 13.3        Chemistries:  Recent Labs   Lab 05/09/25  2021 05/10/25  0302 05/10/25  1351 05/11/25  0301    137 133* 135*   K 3.6 3.8 3.9 3.9    104 101 101   CO2 25 25 25 23   BUN 6 5* 4* 6   CREATININE 0.6 0.6 0.5 0.6   CALCIUM 7.4* 7.8* 8.1* 8.5*   ALBUMIN 2.4* 2.3*  --  2.5*   PROT 4.9* 5.1*  --  6.1   BILITOT 1.9* 1.7*  --  1.6*   ALKPHOS 52 52  --  64   ALT 16 16  --  17   AST 43 41  --  36   MG  --  1.7  --  1.8   PHOS  --  1.4*  --  2.6*       All pertinent labs within the past 24 hours have been reviewed.    Significant Imaging:  I have reviewed all pertinent imaging results/findings within the past 24 hours.

## 2025-05-11 NOTE — ASSESSMENT & PLAN NOTE
44-year-old gentleman presenting with 5 days of severe back pain, imaging concerning for symptomatic AAA due to connective tissue disorder.     S/p urgent open repair of ruptured AAA with 16x8 bifurcated dacron graft on 5/8. Now awake, extubated. Palpable pedal pulses bilaterally.     - DC NGT  - DC art line  - DC RIJ  - Keep NPO, but OK for sips  - OK for stepdown to PCU  - C/w ASA  - Recommend echo  - Appreciate SICU care

## 2025-05-11 NOTE — ASSESSMENT & PLAN NOTE
Neuro/Psych:     - Sedation: none   - Scheduled Tylenol 1000mg Q8H   - PRN PCA - decreased demand bolus to 0.1mg q30min for max dose of 0.2mg/hr               Cardiac:     - S/p AAA open repair (16x8 bifurcated dacron graft) with Dr. Rhodes.     - BP Goal: -160    - Inotropes/Pressors: none    - Cardene drip off this morning    - Rhythm: NSR    - lactic acid resolved    - echo      Pulmonary:     - Goal SpO2 >92%    - IS, ambulate, pulm toilet         Renal:    - Trend BUN/Cr     - dc cervantes, record strict I/Os    Recent Labs   Lab 05/10/25  0302 05/10/25  1351 05/11/25  0301   BUN 5* 4* 6   CREATININE 0.6 0.5 0.6           FEN / GI:     - Holding fluids for now; making good UOP will add fluids if UOP decreases      - Daily CMP, PRN K/Mag/Phos per protocol     - Replace electrolytes as needed    - Nutrition: NPO until ROBF    - Bowel Regimen: Miralax, docusate      ID:     - Afebrile    Recent Labs   Lab 05/09/25  0742 05/10/25  0302 05/11/25  0301   WBC 14.56* 12.89* 13.60*           Heme/Onc:     - Hgb 15.3 pre-operatively    - CBC Q8H     - Will start DVT ppx today    Recent Labs   Lab 05/08/25  0553 05/08/25  1207 05/08/25  1428 05/08/25  1446 05/08/25  2037 05/09/25  0742 05/10/25  0302 05/11/25  0301   HGB  --  11.3*   < >  --    < > 10.0* 9.8* 11.2*   PLT  --  147*   < >  --    < > 138* 140* 231   APTT  --  27.1  --  26.9  --   --   --   --    INR 1.1 1.3*  --  1.1  --   --   --   --     < > = values in this interval not displayed.           Endocrine:     - CTS Goal -140        PPx:   Feeding: NPO, will advance as tolerated  Analgesia/Sedation: Tylenol 1000mg Q8H, PCA  Thromboembolic Prevention: SCDs, lovenox   HOB >30: Yes  Stress Ulcer: not indicated  Glucose Control: No      Lines/Drains/Airway:   Art Line: right radial  -  removing   Central Line: right IJ trialysis - removing   NGT -  removing           Dispo/Code Status/Palliative:     - Stable to SD to floor    - Full Code

## 2025-05-11 NOTE — EICU
VICU Night Rounds Checklist  24H Vital Sign Range:  Temp:  [97.8 °F (36.6 °C)-98.1 °F (36.7 °C)]   Pulse:  []   Resp:  [9-40]   BP: (102-143)/(64-92)   SpO2:  [93 %-100 %]   Arterial Line BP: (105-151)/(57-81)     Video rounds and LDA reconciliation

## 2025-05-11 NOTE — PROGRESS NOTES
[facility]  Vascular Surgery  Progress Note    Patient Name: Dirk King  MRN: [id number]  Admission Date: 5/8/2025  Primary Care Provider: Daysi, Primary Doctor    Subjective:     Interval History: Palpable pedal pulses on exam. NGT with minimal output. Voiding, passing flatus, OOB this morning, pain controlled. Not on pressors.     Post-Op Info:  Procedure(s) (LRB):  REPAIR-ANEURYSM-ABDOMINAL-AORTIC (AAA) class a @0619 (N/A)   3 Days Post-Op     Medications:  Continuous Infusions:   hydromorphone in 0.9 % NaCl 6 mg/30 ml   Intravenous Continuous   Rate Change at 05/10/25 2058    nicardipine  0-15 mg/hr Intravenous Continuous   Stopped at 05/10/25 2313     Scheduled Meds:   acetaminophen  1,000 mg Per NG tube Q8H    amLODIPine  10 mg Per NG tube Daily    aspirin  81 mg Oral Daily    enoxparin  40 mg Subcutaneous Q24H (prophylaxis, 1700)    LIDOcaine  2 patch Transdermal Q24H    mupirocin   Nasal BID     PRN Meds:  Current Facility-Administered Medications:     0.9%  NaCl infusion (for blood administration), , Intravenous, Q24H PRN    calcium gluconate IVPB, 1 g, Intravenous, PRN    calcium gluconate IVPB, 2 g, Intravenous, PRN    calcium gluconate IVPB, 3 g, Intravenous, PRN    docusate sodium, 50 mg, Oral, BID PRN    hydrALAZINE, 10 mg, Intravenous, Q4H PRN    magnesium sulfate 2 g IVPB, 2 g, Intravenous, PRN    magnesium sulfate 2 g IVPB, 2 g, Intravenous, PRN    naloxone, 0.02 mg, Intravenous, PRN    polyethylene glycol, 17 g, Oral, Daily PRN    potassium chloride in water, 40 mEq, Intravenous, PRN **AND** potassium chloride in water, 40 mEq, Intravenous, PRN **AND** potassium chloride in water, 40 mEq, Intravenous, PRN    sodium phosphate 20.1 mmol in D5W 250 mL IVPB, 20.1 mmol, Intravenous, PRN    sodium phosphate 30 mmol in D5W 250 mL IVPB, 30 mmol, Intravenous, PRN     Objective:     Vital Signs (Most Recent):  Temp: 98.4 °F (36.9 °C) (05/11/25 0300)  Pulse: 103 (05/11/25  0400)  Resp: (!) 24 (05/11/25 0400)  BP: 118/83 (05/11/25 0400)  SpO2: 98 % (05/11/25 0400) Vital Signs (24h Range):  Temp:  [97.8 °F (36.6 °C)-98.4 °F (36.9 °C)] 98.4 °F (36.9 °C)  Pulse:  [] 103  Resp:  [9-40] 24  SpO2:  [95 %-100 %] 98 %  BP: (110-135)/(64-83) 118/83  Arterial Line BP: (105-149)/(57-81) 131/73     Date 05/11/25 0700 - 05/12/25 0659   Shift 6637-8887 4521-2274 5568-3436 24 Hour Total   INTAKE   Shift Total(mL/kg)       OUTPUT   Urine(mL/kg/hr) 600   600   Drains 200   200   Shift Total(mL/kg) 800(8.8)   800(8.8)   Weight (kg) 90.7 90.7 90.7 90.7        Physical Exam  Vitals reviewed.   Constitutional:       Comments: Intubated   HENT:      Head: Normocephalic and atraumatic.      Comments: OG tube in place, R nostril     Nose: Nose normal.   Neck:      Comments: Trialysis catheter in right IJ.  Cardiovascular:      Rate and Rhythm: Normal rate and regular rhythm.      Pulses: Normal pulses.      Comments: L DP 2+, R PT and DP signals  R radial luh  Pulmonary:      Comments: Room air  Abdominal:      Comments: Midline laparotomy incision bandaged, clean/dry   Skin:     General: Skin is warm and dry.   Neurological:      Comments: Alert, oriented          Significant Labs:  CBC:   Recent Labs   Lab 05/11/25  0301   WBC 13.60*   RBC 3.91*   HGB 11.2*   HCT 34.7*      MCV 89   MCH 28.6   MCHC 32.3     CMP:   Recent Labs   Lab 05/11/25 0301   GLU 90   CALCIUM 8.5*   ALBUMIN 2.5*   PROT 6.1   *   K 3.9   CO2 23      BUN 6   CREATININE 0.6   ALKPHOS 64   ALT 17   AST 36   BILITOT 1.6*       Significant Diagnostics:  I have reviewed all pertinent imaging results/findings within the past 24 hours.  Assessment/Plan:     * Ruptured infrarenal abdominal aortic aneurysm (AAA)  44-year-old gentleman presenting with 5 days of severe back pain, imaging concerning for symptomatic AAA due to connective tissue disorder.     S/p urgent open repair of ruptured AAA with 16x8 bifurcated  dacron graft on 5/8. Now awake, extubated. Palpable pedal pulses bilaterally.     - DC NGT  - DC art line  - DC RIJ  - Keep NPO, but OK for sips  - OK for stepdown to PCU  - C/w ASA  - Recommend echo  - Appreciate SICU care        Aditya Cordova MD  Vascular Surgery  [facility]

## 2025-05-11 NOTE — NURSING TRANSFER
Nursing Transfer Note      5/11/2025   12:46 PM    Nurse giving handoff:Jude  Nurse receiving handoff:Branden    Reason patient is being transferred: Step down    Transfer To: PCU    Transfer via wheelchair    Transfer with IV drip    Transported by RN    Transfer Vital Signs:  Blood Pressure:122/80  Heart Rate:102  O2:98  Temperature:98  Respirations:18    Telemetry: Rhythm Sinus Tach  Order for Tele Monitor? Yes    Additional Lines:     Medicines sent: NA    Any special needs or follow-up needed: N/A    Patient belongings transferred with patient: Yes    Chart send with patient: Yes    Notified:     Patient reassessed at:  (date, time)  1  Upon arrival to floor: cardiac monitor applied, patient oriented to room, call bell in reach, and bed in lowest position

## 2025-05-11 NOTE — SUBJECTIVE & OBJECTIVE
Medications:  Continuous Infusions:   hydromorphone in 0.9 % NaCl 6 mg/30 ml   Intravenous Continuous   Rate Change at 05/10/25 2058    nicardipine  0-15 mg/hr Intravenous Continuous   Stopped at 05/10/25 2313     Scheduled Meds:   acetaminophen  1,000 mg Per NG tube Q8H    amLODIPine  10 mg Per NG tube Daily    aspirin  81 mg Oral Daily    enoxparin  40 mg Subcutaneous Q24H (prophylaxis, 1700)    LIDOcaine  2 patch Transdermal Q24H    mupirocin   Nasal BID     PRN Meds:  Current Facility-Administered Medications:     0.9%  NaCl infusion (for blood administration), , Intravenous, Q24H PRN    calcium gluconate IVPB, 1 g, Intravenous, PRN    calcium gluconate IVPB, 2 g, Intravenous, PRN    calcium gluconate IVPB, 3 g, Intravenous, PRN    docusate sodium, 50 mg, Oral, BID PRN    hydrALAZINE, 10 mg, Intravenous, Q4H PRN    magnesium sulfate 2 g IVPB, 2 g, Intravenous, PRN    magnesium sulfate 2 g IVPB, 2 g, Intravenous, PRN    naloxone, 0.02 mg, Intravenous, PRN    polyethylene glycol, 17 g, Oral, Daily PRN    potassium chloride in water, 40 mEq, Intravenous, PRN **AND** potassium chloride in water, 40 mEq, Intravenous, PRN **AND** potassium chloride in water, 40 mEq, Intravenous, PRN    sodium phosphate 20.1 mmol in D5W 250 mL IVPB, 20.1 mmol, Intravenous, PRN    sodium phosphate 30 mmol in D5W 250 mL IVPB, 30 mmol, Intravenous, PRN     Objective:     Vital Signs (Most Recent):  Temp: 98.4 °F (36.9 °C) (05/11/25 0300)  Pulse: 103 (05/11/25 0400)  Resp: (!) 24 (05/11/25 0400)  BP: 118/83 (05/11/25 0400)  SpO2: 98 % (05/11/25 0400) Vital Signs (24h Range):  Temp:  [97.8 °F (36.6 °C)-98.4 °F (36.9 °C)] 98.4 °F (36.9 °C)  Pulse:  [] 103  Resp:  [9-40] 24  SpO2:  [95 %-100 %] 98 %  BP: (110-135)/(64-83) 118/83  Arterial Line BP: (105-149)/(57-81) 131/73     Date 05/11/25 0700 - 05/12/25 0659   Shift 3419-8462 5726-5118 5955-6812 24 Hour Total   INTAKE   Shift Total(mL/kg)       OUTPUT   Urine(mL/kg/hr) 600   600    Drains 200   200   Shift Total(mL/kg) 800(8.8)   800(8.8)   Weight (kg) 90.7 90.7 90.7 90.7        Physical Exam  Vitals reviewed.   Constitutional:       Comments: Intubated   HENT:      Head: Normocephalic and atraumatic.      Comments: OG tube in place, R nostril     Nose: Nose normal.   Neck:      Comments: Trialysis catheter in right IJ.  Cardiovascular:      Rate and Rhythm: Normal rate and regular rhythm.      Pulses: Normal pulses.      Comments: L DP 2+, R PT and DP signals  R radial luh  Pulmonary:      Comments: Room air  Abdominal:      Comments: Midline laparotomy incision bandaged, clean/dry   Skin:     General: Skin is warm and dry.   Neurological:      Comments: Alert, oriented          Significant Labs:  CBC:   Recent Labs   Lab 05/11/25  0301   WBC 13.60*   RBC 3.91*   HGB 11.2*   HCT 34.7*      MCV 89   MCH 28.6   MCHC 32.3     CMP:   Recent Labs   Lab 05/11/25  0301   GLU 90   CALCIUM 8.5*   ALBUMIN 2.5*   PROT 6.1   *   K 3.9   CO2 23      BUN 6   CREATININE 0.6   ALKPHOS 64   ALT 17   AST 36   BILITOT 1.6*       Significant Diagnostics:  I have reviewed all pertinent imaging results/findings within the past 24 hours.

## 2025-05-11 NOTE — RESPIRATORY THERAPY
RAPID RESPONSE RESPIRATORY THERAPY ETCO2 CHECK         Time of visit:      Code Status: No Order   : 1980  Bed: 97033/14400 A:   MRN: [id number]  Time spent at the bedside: < 15 min    SITUATION    Evaluated patient for: ETCo2 compliance    BACKGROUND    Why is the patient in the hospital?: Ruptured infrarenal abdominal aortic aneurysm (AAA)    Patient has no past medical history on file.    24 Hours Vitals Range:  Temp:  [98.1 °F (36.7 °C)-98.5 °F (36.9 °C)]   Pulse:  []   Resp:  [0-34]   BP: (111-135)/(64-83)   SpO2:  [96 %-100 %]   Arterial Line BP: (117-149)/(61-81)     Labs:    Recent Labs     25  0313 25  0742 05/10/25  0302 05/10/25  1351 25  0301   NA  --    < > 137 133* 135*   K  --    < > 3.8 3.9 3.9   CL  --    < > 104 101 101   CO2  --    < > 25 25 23   BUN  --    < > 5* 4* 6   CREATININE  --    < > 0.6 0.5 0.6   GLU  --    < > 82 85 90   PHOS 3.2  --  1.4*  --  2.6*   MG 1.4*  --  1.7  --  1.8    < > = values in this interval not displayed.        Recent Labs     25  1602   PH 7.365   PCO2 38.6   PO2 181*   HCO3 22.0*   POCSATURATED 100   BE -3*       ASSESSMENT/INTERVENTIONS         Last VS   Temp: 98.5 °F (36.9 °C) (1100)  Pulse: 106 (1452)  Resp: 20 (1452)  BP: 122/83 (1100)  SpO2: 99 % (1452)  Arterial Line BP: 125/64 (1112)    Level of Consciousness: Level of Consciousness (AVPU): alert  Respiratory Effort: Respiratory Effort: Unlabored Expansion/Accessory Muscle Usage: Expansion/Accessory Muscles/Retractions: expansion symmetric, no retractions, no use of accessory muscles  All Lung Field Breath Sounds: All Lung Fields Breath Sounds: Anterior:, Lateral:, clear, equal bilaterally  PREETHI Breath Sounds: clear, equal bilaterally  Is the ETCO2 monitor on? Yes  Is the patient wearing a cannula? Yes  Are ETCO2 orders placed? Yes  Is the patient on a PCA pump? Yes  ETCO2 monitored: ETCO2 (mmHg): 36 mmHg  Ambu at bedside:  Yes.    Active Orders   Respiratory Care    END TIDAL CO2 MONITOR Q12H     Frequency: Q12H     Number of Occurrences: Until Specified    Incentive spirometry     Frequency: Q4H     Number of Occurrences: Until Specified    Oxygen Continuous     Frequency: Continuous     Number of Occurrences: Until Specified     Order Questions:      Device type: Low flow      Device: Nasal Cannula (1- 5 Liters)      LPM: 2      Titrate O2 per Oxygen Titration Protocol: Yes      To maintain SpO2 goal of: >= 90%      Notify MD of: Inability to achieve desired SpO2; Sudden change in patient status and requires 20% increase in FiO2; Patient requires >60% FiO2    POCT ARTERIAL BLOOD GAS Blood Gas, Lytes (NA,K,ICA,HCT)     Frequency: Q6H PRN     Number of Occurrences: Until Specified     Order Comments: Notify Physician if: see parameters below.       Order Questions:      Component: Blood Gas      Component: Lytes (NA,K,ICA,HCT)    Pulse Oximetry Continuous     Frequency: Continuous     Number of Occurrences: Until Specified       RECOMMENDATIONS    We recommend: RRT Recs: Continue POC per primary team.    FOLLOW-UP    Please call back the Rapid Response [facility] RRT at x [phone] for any questions or concerns.

## 2025-05-11 NOTE — EICU
Intervention Initiated From:  [facility] / EICU    Oniel intervened regarding:  Rounding (Video assessment)    Comments: Virtual ICU Quality Rounds    Admit Date: 5/8/2025  Hospital Day: 3    ICU Day: 2d 21h    24H Vital Sign Range:  Temp:  [97.8 °F (36.6 °C)-98.4 °F (36.9 °C)]   Pulse:  []   Resp:  [0-40]   BP: (110-135)/(64-83)   SpO2:  [96 %-100 %]   Arterial Line BP: (111-149)/(57-81)     VICU Surveillance Screening    Daily review of  line necessity(optional): Central line(s) in place    Central line type (optional): Dialysis(non-tunneled)    LDA reconciliation : Yes

## 2025-05-12 LAB
ABO + RH BLD: NORMAL
ABSOLUTE EOSINOPHIL (OHS): 0.09 K/UL
ABSOLUTE MONOCYTE (OHS): 1.52 K/UL (ref 0.3–1)
ABSOLUTE NEUTROPHIL COUNT (OHS): 10.14 K/UL (ref 1.8–7.7)
ALBUMIN SERPL BCP-MCNC: 2.6 G/DL (ref 3.5–5.2)
ALP SERPL-CCNC: 80 UNIT/L (ref 40–150)
ALT SERPL W/O P-5'-P-CCNC: 17 UNIT/L (ref 10–44)
ANION GAP (OHS): 13 MMOL/L (ref 8–16)
AORTIC SIZE INDEX: 1.4 CM/M2
ASCENDING AORTA: 2.9 CM
AST SERPL-CCNC: 26 UNIT/L (ref 11–45)
AV AREA BY CONTINUOUS VTI: 4 CM2
AV INDEX (PROSTH): 1.04
AV LVOT MEAN GRADIENT: 1 MMHG
AV LVOT PEAK GRADIENT: 3 MMHG
AV MEAN GRADIENT: 2 MMHG
AV PEAK GRADIENT: 3 MMHG
AV VALVE AREA BY VELOCITY RATIO: 3.8 CM²
AV VALVE AREA: 3.9 CM2
AV VELOCITY RATIO: 1
BASOPHILS # BLD AUTO: 0.06 K/UL
BASOPHILS NFR BLD AUTO: 0.4 %
BILIRUB SERPL-MCNC: 1.1 MG/DL (ref 0.1–1)
BLD PROD TYP BPU: NORMAL
BLOOD UNIT EXPIRATION DATE: NORMAL
BLOOD UNIT TYPE CODE: 5100
BSA FOR ECHO PROCEDURE: 2.07 M2
BUN SERPL-MCNC: 13 MG/DL (ref 6–20)
CALCIUM SERPL-MCNC: 9.5 MG/DL (ref 8.7–10.5)
CHLORIDE SERPL-SCNC: 97 MMOL/L (ref 95–110)
CO2 SERPL-SCNC: 26 MMOL/L (ref 23–29)
CREAT SERPL-MCNC: 0.7 MG/DL (ref 0.5–1.4)
CROSSMATCH INTERPRETATION: NORMAL
CV ECHO LV RWT: 0.38 CM
DISPENSE STATUS: NORMAL
DOP CALC AO PEAK VEL: 0.9 M/S
DOP CALC AO VTI: 15.6 CM
DOP CALC LVOT AREA: 3.8 CM2
DOP CALC LVOT DIAMETER: 2.2 CM
DOP CALC LVOT PEAK VEL: 0.9 M/S
DOP CALC LVOT STROKE VOLUME: 61.6 CM3
DOP CALCLVOT PEAK VEL VTI: 16.2 CM
E WAVE DECELERATION TIME: 148 MS
E/A RATIO: 0.98
E/E' RATIO: 5 M/S
ECHO EF ESTIMATED: 60 %
ECHO LV POSTERIOR WALL: 1 CM (ref 0.6–1.1)
ERYTHROCYTE [DISTWIDTH] IN BLOOD BY AUTOMATED COUNT: 13.2 % (ref 11.5–14.5)
FRACTIONAL SHORTENING: 32.1 % (ref 28–44)
GFR SERPLBLD CREATININE-BSD FMLA CKD-EPI: >60 ML/MIN/1.73/M2
GLUCOSE SERPL-MCNC: 88 MG/DL (ref 70–110)
HCT VFR BLD AUTO: 38.1 % (ref 40–54)
HGB BLD-MCNC: 12.8 GM/DL (ref 14–18)
IMM GRANULOCYTES # BLD AUTO: 0.16 K/UL (ref 0–0.04)
IMM GRANULOCYTES NFR BLD AUTO: 1.1 % (ref 0–0.5)
INTERVENTRICULAR SEPTUM: 0.7 CM (ref 0.6–1.1)
IVRT: 101 MS
LA MAJOR: 5.2 CM
LA MINOR: 5.2 CM
LA WIDTH: 3.3 CM
LEFT ATRIUM SIZE: 3 CM
LEFT ATRIUM VOLUME INDEX MOD: 23 ML/M2
LEFT ATRIUM VOLUME INDEX: 22 ML/M2
LEFT ATRIUM VOLUME MOD: 46 ML
LEFT ATRIUM VOLUME: 44 CM3
LEFT INTERNAL DIMENSION IN SYSTOLE: 3.6 CM (ref 2.1–4)
LEFT VENTRICLE DIASTOLIC VOLUME INDEX: 65.84 ML/M2
LEFT VENTRICLE DIASTOLIC VOLUME: 133 ML
LEFT VENTRICLE MASS INDEX: 80.3 G/M2
LEFT VENTRICLE SYSTOLIC VOLUME INDEX: 26.2 ML/M2
LEFT VENTRICLE SYSTOLIC VOLUME: 53 ML
LEFT VENTRICULAR INTERNAL DIMENSION IN DIASTOLE: 5.3 CM (ref 3.5–6)
LEFT VENTRICULAR MASS: 162.1 G
LV LATERAL E/E' RATIO: 3.7
LV SEPTAL E/E' RATIO: 5.9
LYMPHOCYTES # BLD AUTO: 2.21 K/UL (ref 1–4.8)
MAGNESIUM SERPL-MCNC: 2 MG/DL (ref 1.6–2.6)
MCH RBC QN AUTO: 29.9 PG (ref 27–31)
MCHC RBC AUTO-ENTMCNC: 33.6 G/DL (ref 32–36)
MCV RBC AUTO: 89 FL (ref 82–98)
MV PEAK A VEL: 0.6 M/S
MV PEAK E VEL: 0.59 M/S
NUCLEATED RBC (/100WBC) (OHS): 0 /100 WBC
OHS CV RV/LV RATIO: 0.6 CM
PHOSPHATE SERPL-MCNC: 2.8 MG/DL (ref 2.7–4.5)
PISA TR MAX VEL: 2.6 M/S
PLATELET # BLD AUTO: 378 K/UL (ref 150–450)
PLT APH: NORMAL
PMV BLD AUTO: 9 FL (ref 9.2–12.9)
POTASSIUM SERPL-SCNC: 4.1 MMOL/L (ref 3.5–5.1)
PROT SERPL-MCNC: 7 GM/DL (ref 6–8.4)
RA MAJOR: 4.32 CM
RA PRESSURE ESTIMATED: 3 MMHG
RA WIDTH: 3.29 CM
RBC # BLD AUTO: 4.28 M/UL (ref 4.6–6.2)
RELATIVE EOSINOPHIL (OHS): 0.6 %
RELATIVE LYMPHOCYTE (OHS): 15.6 % (ref 18–48)
RELATIVE MONOCYTE (OHS): 10.7 % (ref 4–15)
RELATIVE NEUTROPHIL (OHS): 71.6 % (ref 38–73)
RIGHT VENTRICLE DIASTOLIC BASEL DIMENSION: 3.2 CM
RV TB RVSP: 6 MMHG
RV TISSUE DOPPLER FREE WALL SYSTOLIC VELOCITY 1 (APICAL 4 CHAMBER VIEW): 19.76 CM/S
SINUS: 3.87 CM
SODIUM SERPL-SCNC: 136 MMOL/L (ref 136–145)
STJ: 3.6 CM
TDI LATERAL: 0.16 M/S
TDI SEPTAL: 0.1 M/S
TDI: 0.13 M/S
TRICUSPID ANNULAR PLANE SYSTOLIC EXCURSION: 1.8 CM
TV PEAK GRADIENT: 28 MMHG
TV REST PULMONARY ARTERY PRESSURE: 30 MMHG
UNIT NUMBER: NORMAL
WBC # BLD AUTO: 14.18 K/UL (ref 3.9–12.7)
Z-SCORE OF LEFT VENTRICULAR DIMENSION IN END DIASTOLE: -1.16
Z-SCORE OF LEFT VENTRICULAR DIMENSION IN END SYSTOLE: -0.11

## 2025-05-12 PROCEDURE — 25000003 PHARM REV CODE 250

## 2025-05-12 PROCEDURE — 63600175 PHARM REV CODE 636 W HCPCS

## 2025-05-12 PROCEDURE — 84100 ASSAY OF PHOSPHORUS: CPT | Performed by: STUDENT IN AN ORGANIZED HEALTH CARE EDUCATION/TRAINING PROGRAM

## 2025-05-12 PROCEDURE — 85025 COMPLETE CBC W/AUTO DIFF WBC: CPT

## 2025-05-12 PROCEDURE — 80053 COMPREHEN METABOLIC PANEL: CPT

## 2025-05-12 PROCEDURE — 36415 COLL VENOUS BLD VENIPUNCTURE: CPT

## 2025-05-12 PROCEDURE — 83735 ASSAY OF MAGNESIUM: CPT | Performed by: STUDENT IN AN ORGANIZED HEALTH CARE EDUCATION/TRAINING PROGRAM

## 2025-05-12 PROCEDURE — 20600001 HC STEP DOWN PRIVATE ROOM

## 2025-05-12 RX ORDER — OXYCODONE HYDROCHLORIDE 10 MG/1
10 TABLET ORAL EVERY 4 HOURS PRN
Refills: 0 | Status: DISCONTINUED | OUTPATIENT
Start: 2025-05-12 | End: 2025-05-14

## 2025-05-12 RX ORDER — ACETAMINOPHEN 500 MG
1000 TABLET ORAL EVERY 8 HOURS
Status: DISCONTINUED | OUTPATIENT
Start: 2025-05-12 | End: 2025-05-14 | Stop reason: HOSPADM

## 2025-05-12 RX ORDER — HYDROMORPHONE HYDROCHLORIDE 2 MG/ML
0.5 INJECTION, SOLUTION INTRAMUSCULAR; INTRAVENOUS; SUBCUTANEOUS EVERY 6 HOURS PRN
Status: DISCONTINUED | OUTPATIENT
Start: 2025-05-12 | End: 2025-05-14

## 2025-05-12 RX ORDER — METHOCARBAMOL 500 MG/1
500 TABLET, FILM COATED ORAL 3 TIMES DAILY
Status: DISCONTINUED | OUTPATIENT
Start: 2025-05-12 | End: 2025-05-14 | Stop reason: HOSPADM

## 2025-05-12 RX ORDER — OXYCODONE HYDROCHLORIDE 5 MG/1
5 TABLET ORAL EVERY 4 HOURS PRN
Refills: 0 | Status: DISCONTINUED | OUTPATIENT
Start: 2025-05-12 | End: 2025-05-14 | Stop reason: HOSPADM

## 2025-05-12 RX ORDER — AMLODIPINE BESYLATE 10 MG/1
10 TABLET ORAL DAILY
Status: DISCONTINUED | OUTPATIENT
Start: 2025-05-13 | End: 2025-05-14 | Stop reason: HOSPADM

## 2025-05-12 RX ADMIN — METHOCARBAMOL 500 MG: 500 TABLET ORAL at 03:05

## 2025-05-12 RX ADMIN — ASPIRIN 81 MG: 81 TABLET, COATED ORAL at 10:05

## 2025-05-12 RX ADMIN — OXYCODONE HYDROCHLORIDE 10 MG: 10 TABLET ORAL at 09:05

## 2025-05-12 RX ADMIN — METHOCARBAMOL 500 MG: 500 TABLET ORAL at 09:05

## 2025-05-12 RX ADMIN — MUPIROCIN: 20 OINTMENT TOPICAL at 10:05

## 2025-05-12 RX ADMIN — ACETAMINOPHEN 1000 MG: 500 TABLET ORAL at 06:05

## 2025-05-12 RX ADMIN — OXYCODONE HYDROCHLORIDE 10 MG: 10 TABLET ORAL at 12:05

## 2025-05-12 RX ADMIN — ACETAMINOPHEN 1000 MG: 500 TABLET ORAL at 12:05

## 2025-05-12 RX ADMIN — ACETAMINOPHEN 1000 MG: 500 TABLET ORAL at 09:05

## 2025-05-12 RX ADMIN — AMLODIPINE BESYLATE 10 MG: 10 TABLET ORAL at 10:05

## 2025-05-12 RX ADMIN — Medication: at 06:05

## 2025-05-12 RX ADMIN — LIDOCAINE 2 PATCH: 50 PATCH CUTANEOUS at 05:05

## 2025-05-12 RX ADMIN — METHOCARBAMOL 500 MG: 500 TABLET ORAL at 10:05

## 2025-05-12 RX ADMIN — ENOXAPARIN SODIUM 40 MG: 40 INJECTION SUBCUTANEOUS at 04:05

## 2025-05-12 NOTE — PROGRESS NOTES
[facility]  Vascular Surgery  Progress Note    Patient Name: Dirk King  MRN: [id number]  Admission Date: 5/8/2025  Primary Care Provider: Daysi, Primary Doctor    Subjective:     Interval History: Stepdown to PCU yesterday. Doing well this AM. Persistent tachycardia to 100s. Pain well controlled on PCA. Tolerating sips.     Post-Op Info:  Procedure(s) (LRB):  REPAIR-ANEURYSM-ABDOMINAL-AORTIC (AAA) class a @0619 (N/A)   4 Days Post-Op     Medications:  Continuous Infusions:   hydromorphone in 0.9 % NaCl 6 mg/30 ml   Intravenous Continuous   Rate Change at 05/11/25 1112     Scheduled Meds:   acetaminophen  1,000 mg Per NG tube Q8H    amLODIPine  10 mg Per NG tube Daily    aspirin  81 mg Oral Daily    enoxparin  40 mg Subcutaneous Q24H (prophylaxis, 1700)    LIDOcaine  2 patch Transdermal Q24H    mupirocin   Nasal BID     PRN Meds:  Current Facility-Administered Medications:     0.9%  NaCl infusion (for blood administration), , Intravenous, Q24H PRN    calcium gluconate IVPB, 1 g, Intravenous, PRN    calcium gluconate IVPB, 2 g, Intravenous, PRN    calcium gluconate IVPB, 3 g, Intravenous, PRN    docusate sodium, 50 mg, Oral, BID PRN    hydrALAZINE, 10 mg, Intravenous, Q4H PRN    magnesium sulfate 2 g IVPB, 2 g, Intravenous, PRN    magnesium sulfate 2 g IVPB, 2 g, Intravenous, PRN    naloxone, 0.02 mg, Intravenous, PRN    polyethylene glycol, 17 g, Oral, Daily PRN    potassium chloride in water, 40 mEq, Intravenous, PRN **AND** potassium chloride in water, 40 mEq, Intravenous, PRN **AND** potassium chloride in water, 40 mEq, Intravenous, PRN    sodium phosphate 20.1 mmol in D5W 250 mL IVPB, 20.1 mmol, Intravenous, PRN    sodium phosphate 30 mmol in D5W 250 mL IVPB, 30 mmol, Intravenous, PRN     Objective:     Vital Signs (Most Recent):  Temp: 98.5 °F (36.9 °C) (05/12/25 0400)  Pulse: 106 (05/12/25 0400)  Resp: 18 (05/12/25 0400)  BP: 126/87 (05/12/25 0400)  SpO2: 97 % (05/12/25 0400)  Vital Signs (24h Range):  Temp:  [97.9 °F (36.6 °C)-98.5 °F (36.9 °C)] 98.5 °F (36.9 °C)  Pulse:  [] 106  Resp:  [14-31] 18  SpO2:  [95 %-99 %] 97 %  BP: (116-132)/(75-87) 126/87  Arterial Line BP: (117-129)/(61-72) 125/64          Physical Exam  Vitals reviewed.   Constitutional:       Comments: Intubated   HENT:      Head: Normocephalic and atraumatic.      Nose: Nose normal.   Cardiovascular:      Rate and Rhythm: Normal rate and regular rhythm.      Pulses: Normal pulses.      Comments: L DP 2+, R PT and DP signals    Pulmonary:      Comments: Room air  Abdominal:      Comments: Midline laparotomy incision c/d/i   Skin:     General: Skin is warm and dry.   Neurological:      Comments: Alert, oriented          Significant Labs:  CBC:   Recent Labs   Lab 05/11/25  0301   WBC 13.60*   RBC 3.91*   HGB 11.2*   HCT 34.7*      MCV 89   MCH 28.6   MCHC 32.3     CMP:   Recent Labs   Lab 05/11/25  0301   GLU 90   CALCIUM 8.5*   ALBUMIN 2.5*   PROT 6.1   *   K 3.9   CO2 23      BUN 6   CREATININE 0.6   ALKPHOS 64   ALT 17   AST 36   BILITOT 1.6*       Significant Diagnostics:  I have reviewed all pertinent imaging results/findings within the past 24 hours.  Assessment/Plan:     * Ruptured infrarenal abdominal aortic aneurysm (AAA)  44-year-old gentleman presenting with 5 days of severe back pain, imaging concerning for symptomatic AAA due to connective tissue disorder.     S/p urgent open repair of ruptured AAA with 16x8 bifurcated dacron graft on 5/8. Now awake, extubated. Palpable pedal pulses bilaterally.     - CLD as tolerated  - Transition to oral MMPC  - C/w ASA  - PT/OT  - Encourage OOBTC  - Encourage IS          Maida Oakley MD  Vascular Surgery  [facility]

## 2025-05-12 NOTE — SUBJECTIVE & OBJECTIVE
Medications:  Continuous Infusions:   hydromorphone in 0.9 % NaCl 6 mg/30 ml   Intravenous Continuous   Rate Change at 05/11/25 1112     Scheduled Meds:   acetaminophen  1,000 mg Per NG tube Q8H    amLODIPine  10 mg Per NG tube Daily    aspirin  81 mg Oral Daily    enoxparin  40 mg Subcutaneous Q24H (prophylaxis, 1700)    LIDOcaine  2 patch Transdermal Q24H    mupirocin   Nasal BID     PRN Meds:  Current Facility-Administered Medications:     0.9%  NaCl infusion (for blood administration), , Intravenous, Q24H PRN    calcium gluconate IVPB, 1 g, Intravenous, PRN    calcium gluconate IVPB, 2 g, Intravenous, PRN    calcium gluconate IVPB, 3 g, Intravenous, PRN    docusate sodium, 50 mg, Oral, BID PRN    hydrALAZINE, 10 mg, Intravenous, Q4H PRN    magnesium sulfate 2 g IVPB, 2 g, Intravenous, PRN    magnesium sulfate 2 g IVPB, 2 g, Intravenous, PRN    naloxone, 0.02 mg, Intravenous, PRN    polyethylene glycol, 17 g, Oral, Daily PRN    potassium chloride in water, 40 mEq, Intravenous, PRN **AND** potassium chloride in water, 40 mEq, Intravenous, PRN **AND** potassium chloride in water, 40 mEq, Intravenous, PRN    sodium phosphate 20.1 mmol in D5W 250 mL IVPB, 20.1 mmol, Intravenous, PRN    sodium phosphate 30 mmol in D5W 250 mL IVPB, 30 mmol, Intravenous, PRN     Objective:     Vital Signs (Most Recent):  Temp: 98.5 °F (36.9 °C) (05/12/25 0400)  Pulse: 106 (05/12/25 0400)  Resp: 18 (05/12/25 0400)  BP: 126/87 (05/12/25 0400)  SpO2: 97 % (05/12/25 0400) Vital Signs (24h Range):  Temp:  [97.9 °F (36.6 °C)-98.5 °F (36.9 °C)] 98.5 °F (36.9 °C)  Pulse:  [] 106  Resp:  [14-31] 18  SpO2:  [95 %-99 %] 97 %  BP: (116-132)/(75-87) 126/87  Arterial Line BP: (117-129)/(61-72) 125/64          Physical Exam  Vitals reviewed.   Constitutional:       Comments: Intubated   HENT:      Head: Normocephalic and atraumatic.      Nose: Nose normal.   Cardiovascular:      Rate and Rhythm: Normal rate and regular rhythm.      Pulses:  Normal pulses.      Comments: L DP 2+, R PT and DP signals    Pulmonary:      Comments: Room air  Abdominal:      Comments: Midline laparotomy incision c/d/i   Skin:     General: Skin is warm and dry.   Neurological:      Comments: Alert, oriented          Significant Labs:  CBC:   Recent Labs   Lab 05/11/25  0301   WBC 13.60*   RBC 3.91*   HGB 11.2*   HCT 34.7*      MCV 89   MCH 28.6   MCHC 32.3     CMP:   Recent Labs   Lab 05/11/25  0301   GLU 90   CALCIUM 8.5*   ALBUMIN 2.5*   PROT 6.1   *   K 3.9   CO2 23      BUN 6   CREATININE 0.6   ALKPHOS 64   ALT 17   AST 36   BILITOT 1.6*       Significant Diagnostics:  I have reviewed all pertinent imaging results/findings within the past 24 hours.

## 2025-05-12 NOTE — ASSESSMENT & PLAN NOTE
44-year-old gentleman presenting with 5 days of severe back pain, imaging concerning for symptomatic AAA due to connective tissue disorder.     S/p urgent open repair of ruptured AAA with 16x8 bifurcated dacron graft on 5/8. Now awake, extubated. Palpable pedal pulses bilaterally.     - CLD as tolerated  - Transition to oral MMPC  - C/w ASA  - PT/OT  - Encourage OOBTC  - Encourage IS
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
## 2025-05-13 LAB
ABSOLUTE EOSINOPHIL (OHS): 0.24 K/UL
ABSOLUTE MONOCYTE (OHS): 1.57 K/UL (ref 0.3–1)
ABSOLUTE NEUTROPHIL COUNT (OHS): 8.22 K/UL (ref 1.8–7.7)
ALBUMIN SERPL BCP-MCNC: 2.4 G/DL (ref 3.5–5.2)
ALP SERPL-CCNC: 73 UNIT/L (ref 40–150)
ALT SERPL W/O P-5'-P-CCNC: 16 UNIT/L (ref 10–44)
ANION GAP (OHS): 11 MMOL/L (ref 8–16)
AST SERPL-CCNC: 30 UNIT/L (ref 11–45)
BASOPHILS # BLD AUTO: 0.07 K/UL
BASOPHILS NFR BLD AUTO: 0.6 %
BILIRUB SERPL-MCNC: 0.7 MG/DL (ref 0.1–1)
BUN SERPL-MCNC: 11 MG/DL (ref 6–20)
CALCIUM SERPL-MCNC: 8.8 MG/DL (ref 8.7–10.5)
CHLORIDE SERPL-SCNC: 99 MMOL/L (ref 95–110)
CO2 SERPL-SCNC: 24 MMOL/L (ref 23–29)
CREAT SERPL-MCNC: 0.6 MG/DL (ref 0.5–1.4)
DHEA SERPL-MCNC: NORMAL
ERYTHROCYTE [DISTWIDTH] IN BLOOD BY AUTOMATED COUNT: 13.1 % (ref 11.5–14.5)
ESTROGEN SERPL-MCNC: NORMAL PG/ML
GFR SERPLBLD CREATININE-BSD FMLA CKD-EPI: >60 ML/MIN/1.73/M2
GLUCOSE SERPL-MCNC: 68 MG/DL (ref 70–110)
HCT VFR BLD AUTO: 35.5 % (ref 40–54)
HGB BLD-MCNC: 11.7 GM/DL (ref 14–18)
IMM GRANULOCYTES # BLD AUTO: 0.21 K/UL (ref 0–0.04)
IMM GRANULOCYTES NFR BLD AUTO: 1.7 % (ref 0–0.5)
INSULIN SERPL-ACNC: NORMAL U[IU]/ML
LAB AP CLINICAL INFORMATION: NORMAL
LAB AP GROSS DESCRIPTION: NORMAL
LAB AP PERFORMING LOCATION(S): NORMAL
LAB AP REPORT FOOTNOTES: NORMAL
LYMPHOCYTES # BLD AUTO: 2.29 K/UL (ref 1–4.8)
MAGNESIUM SERPL-MCNC: 1.7 MG/DL (ref 1.6–2.6)
MCH RBC QN AUTO: 28.8 PG (ref 27–31)
MCHC RBC AUTO-ENTMCNC: 33 G/DL (ref 32–36)
MCV RBC AUTO: 87 FL (ref 82–98)
NUCLEATED RBC (/100WBC) (OHS): 0 /100 WBC
PHOSPHATE SERPL-MCNC: 2.4 MG/DL (ref 2.7–4.5)
PLATELET # BLD AUTO: 378 K/UL (ref 150–450)
PMV BLD AUTO: 9.3 FL (ref 9.2–12.9)
POTASSIUM SERPL-SCNC: 4.7 MMOL/L (ref 3.5–5.1)
PROT SERPL-MCNC: 6.6 GM/DL (ref 6–8.4)
RBC # BLD AUTO: 4.06 M/UL (ref 4.6–6.2)
RELATIVE EOSINOPHIL (OHS): 1.9 %
RELATIVE LYMPHOCYTE (OHS): 18.2 % (ref 18–48)
RELATIVE MONOCYTE (OHS): 12.5 % (ref 4–15)
RELATIVE NEUTROPHIL (OHS): 65.1 % (ref 38–73)
SODIUM SERPL-SCNC: 134 MMOL/L (ref 136–145)
T3RU NFR SERPL: NORMAL %
WBC # BLD AUTO: 12.6 K/UL (ref 3.9–12.7)

## 2025-05-13 PROCEDURE — 36415 COLL VENOUS BLD VENIPUNCTURE: CPT

## 2025-05-13 PROCEDURE — 80053 COMPREHEN METABOLIC PANEL: CPT

## 2025-05-13 PROCEDURE — 25000003 PHARM REV CODE 250

## 2025-05-13 PROCEDURE — 97161 PT EVAL LOW COMPLEX 20 MIN: CPT

## 2025-05-13 PROCEDURE — 83735 ASSAY OF MAGNESIUM: CPT | Performed by: STUDENT IN AN ORGANIZED HEALTH CARE EDUCATION/TRAINING PROGRAM

## 2025-05-13 PROCEDURE — 94799 UNLISTED PULMONARY SVC/PX: CPT

## 2025-05-13 PROCEDURE — 97165 OT EVAL LOW COMPLEX 30 MIN: CPT

## 2025-05-13 PROCEDURE — 85025 COMPLETE CBC W/AUTO DIFF WBC: CPT

## 2025-05-13 PROCEDURE — 84100 ASSAY OF PHOSPHORUS: CPT | Performed by: STUDENT IN AN ORGANIZED HEALTH CARE EDUCATION/TRAINING PROGRAM

## 2025-05-13 PROCEDURE — 94761 N-INVAS EAR/PLS OXIMETRY MLT: CPT

## 2025-05-13 PROCEDURE — 99900035 HC TECH TIME PER 15 MIN (STAT)

## 2025-05-13 PROCEDURE — 11000001 HC ACUTE MED/SURG PRIVATE ROOM

## 2025-05-13 PROCEDURE — 25000003 PHARM REV CODE 250: Performed by: SURGERY

## 2025-05-13 PROCEDURE — 63600175 PHARM REV CODE 636 W HCPCS

## 2025-05-13 RX ORDER — IBUPROFEN 400 MG/1
800 TABLET, FILM COATED ORAL EVERY 8 HOURS
Status: DISCONTINUED | OUTPATIENT
Start: 2025-05-13 | End: 2025-05-14 | Stop reason: HOSPADM

## 2025-05-13 RX ADMIN — ACETAMINOPHEN 1000 MG: 500 TABLET ORAL at 05:05

## 2025-05-13 RX ADMIN — AMLODIPINE BESYLATE 10 MG: 10 TABLET ORAL at 08:05

## 2025-05-13 RX ADMIN — MUPIROCIN: 20 OINTMENT TOPICAL at 08:05

## 2025-05-13 RX ADMIN — OXYCODONE HYDROCHLORIDE 10 MG: 10 TABLET ORAL at 08:05

## 2025-05-13 RX ADMIN — IBUPROFEN 800 MG: 400 TABLET ORAL at 09:05

## 2025-05-13 RX ADMIN — OXYCODONE HYDROCHLORIDE 10 MG: 10 TABLET ORAL at 05:05

## 2025-05-13 RX ADMIN — METHOCARBAMOL 500 MG: 500 TABLET ORAL at 08:05

## 2025-05-13 RX ADMIN — ENOXAPARIN SODIUM 40 MG: 40 INJECTION SUBCUTANEOUS at 04:05

## 2025-05-13 RX ADMIN — IBUPROFEN 800 MG: 400 TABLET ORAL at 02:05

## 2025-05-13 RX ADMIN — MUPIROCIN: 20 OINTMENT TOPICAL at 09:05

## 2025-05-13 RX ADMIN — ASPIRIN 81 MG: 81 TABLET, COATED ORAL at 08:05

## 2025-05-13 RX ADMIN — LIDOCAINE 2 PATCH: 50 PATCH CUTANEOUS at 05:05

## 2025-05-13 RX ADMIN — METHOCARBAMOL 500 MG: 500 TABLET ORAL at 02:05

## 2025-05-13 RX ADMIN — ACETAMINOPHEN 1000 MG: 500 TABLET ORAL at 02:05

## 2025-05-13 RX ADMIN — OXYCODONE HYDROCHLORIDE 10 MG: 10 TABLET ORAL at 11:05

## 2025-05-13 NOTE — PT/OT/SLP EVAL
Physical Therapy Co-Evaluation and Discharge Note    Patient Name:  Dirk King   MRN:  [id number]  Admitting Diagnosis:  Ruptured infrarenal abdominal aortic aneurysm (AAA)   Recent Surgery: Procedure(s) (LRB):  REPAIR-ANEURYSM-ABDOMINAL-AORTIC (AAA) class a @0619 (N/A) 5 Days Post-Op  Admit Date: 5/8/2025  Length of Stay: 5 days    Recommendations:     Discharge Recommendations: No Therapy Indicated  Discharge Equipment Recommendations: none   Barriers to discharge: None    Appropriate transfer level with nursing staff: ambulation 3-4x/day with independence    Assessment:     Dirk King is a 45 y.o. male admitted with a medical diagnosis of Ruptured infrarenal abdominal aortic aneurysm (AAA). Pt presents s/p abdominal aortic aneurysm repair on 5/8/2025. Upon PT evaluation patient appears to be functioning at a high level with no safety concerns at this time. All questions answered within PT scope of practice. PT provided education to pt regarding importance of maintaining frequent activity and ambulating while admitted to maintain current level of mobility. Pt does not require further acute skilled therapy intervention. Please re-consult if pt experiences a change in status.        GOALS: No goals identified at [facility].    Plan:     During this hospitalization, patient does not require further acute PT services.  Please re-consult if situation changes.      Subjective:     RN notified prior to session. No family/friends present upon PT entrance into room. Patient agreeable to PT evaluation.    Chief Complaint: abd pain  Patient/Family Comments/goals: get stronger and go home  Pain/Comfort:  Pain Rating 1: 5/10  Location 1: abdomen  Pain Addressed 1: Reposition, Distraction  Pain Rating Post-Intervention 1: 5/10    Social History:  Residence: Patient lives with their girlfriend in a first floor apartment with number of outside stair(s): 0.  Equipment Owned (not using): none  Equipment Used: none  Prior  "level of function:  Prior to admission, patient was independent  Work: Employed - pt is a cook at a [employer]  Drive: yes.   Managing Medicines/Managing Home: yes.   Assistance Upon Discharge: girlfriend and uncle    Objective:     Additional staff present:  OT; OT for partial co-evaluation due to suspected patient need for two skilled therapists to appropriately assess patient's functional deficits as well as ensure patient safety, accommodate for limited activity tolerance, and provide appropriate, skilled assistance to maximize functional potential during evaluation.      Patient found up in chair with telemetry, blood pressure cuff, pulse ox (continuous) upon PT entry to room.    General Precautions: Standard, fall   Orthopedic Precautions:N/A   Braces: N/A   Body mass index is 31.32 kg/m².  Oxygen Device: Room Air  Vitals: /71 (BP Location: Right arm, Patient Position: Sitting)   Pulse 98   Temp 97.7 °F (36.5 °C) (Oral)   Resp 16   Ht 5' 7" (1.702 m)   Wt 90.7 kg (200 lb)   SpO2 95%   BMI 31.32 kg/m²     Exam:  Cognition:   Alert and Cooperative  Patient is oriented to Person, Place, Time, Situation  Command following: Follows multistep verbal commands  Fluency: clear/fluent  Skin Integrity: Visible skin intact  Edema: None noted   Sensation: Intact  Hearing: Intact  Vision:  Intact  Postural Assessment: no deviations noted  Coordination: grossly WNL  Range of Motion:  RUE: WNL  LUE: WNL  RLE: WNL  LLE: WNL  Strength Exam:  Bilateral Upper Extremity Strength: grossly  WNL  Bilateral Lower Extremity Strength: grossly WNL    Outcome Measures:  AM-PAC 6 CLICK MOBILITY  Turning over in bed (including adjusting bedclothes, sheets and blankets)?: 4  Sitting down on and standing up from a chair with arms (e.g., wheelchair, bedside commode, etc.): 4  Moving from lying on back to sitting on the side of the bed?: 4  Moving to and from a bed to a chair (including a wheelchair)?: 4  Need to walk in hospital " room?: 4  Climbing 3-5 steps with a railing?: 4  Basic Mobility Total Score: 24     Functional Mobility:    Bed Mobility:   Pt found/returned to bedside chair    Transfers:   Sit <> Stand Transfer: supervision with no AD. [id number] from bedside chair  Increased time to reach full stand    Standing Balance:  Static Standing Balance: Normal : able to maintain standing balance against maximal resistance  Dynamic Standing Balance: Good : able to stand independently unsupported and cross midline moderately  Assistance Level Required: Supervision  Patient used: no assistive device   Time: ~2 minutes at sink  Postural deviations noted: no deviations noted  Comments: Pt able to respond to with ankle strategy to internal/external perturbations with appropriate anticipatory and reactive responses with no LOB while performing standing balance task at sink.    Gait:   Patient ambulated: 14 ft + standing task + ~190 ft   Patient required: supervision progressing to independence  Patient used:  no assistive device   Gait Pattern observed: reciprocal gait  Gait Deviation(s): decreased step length, decreased arleth, and decreased arm swing  Impairments due to: pain  all lines remained intact throughout ambulation trial  Comments: As gait trial progressed speed and arm swing improved. Pt was able to perform vertical/horizontal head turns, 180 degree turns while ambulating, and avoid hallway obstacles without LOB.    Education:  Time provided for education, counseling and discussion of health disposition in regards to patient's current status  All questions answered within PT scope of practice and to patient's satisfaction  PT role in POC to address current functional deficits  Pt educated on proper body mechanics, safety techniques, and energy conservation with PT facilitation and cueing throughout session  Whiteboard updated with therapist name and pt's current mobility status documented above  Safe to perform step transfer to/from  chair/bedside commode ind and no AD w/ nursing/PCT present  Pt to ambulate 3-4x/day ind and no AD with nsg/family/self in order to maintain functional mobility  Importance of OOB tolerance 8-10 hrs/day to improve lung ventilation and expansion as well as strengthen postural musculature    DME Justifications:  No DME recommended requiring DME justifications    Patient left up in chair with all lines intact, call button in reach, and RN notified.    History:     History reviewed. No pertinent past medical history.    Past Surgical History:   Procedure Laterality Date    REPAIR OF ABDOMINAL AORTIC ANEURYSM N/A 5/8/2025    Procedure: REPAIR-ANEURYSM-ABDOMINAL-AORTIC (AAA) class a @0619;  Surgeon: JESSICA Rhodes III, MD;  Location: [facility] OR [address] [place] [address];  Service: Vascular;  Laterality: N/A;       No family history on file.    Social History     Socioeconomic History    Marital status: Single   Tobacco Use    Smoking status: Never   Substance and Sexual Activity    Alcohol use: No    Drug use: No     Social Drivers of Health     Financial Resource Strain: High Risk (5/12/2025)    Overall Financial Resource Strain (CARDIA)     Difficulty of Paying Living Expenses: Very hard   Food Insecurity: Food Insecurity Present (5/12/2025)    Hunger Vital Sign     Worried About Running Out of Food in the Last Year: Often true     Ran Out of Food in the Last Year: Often true   Transportation Needs: Unmet Transportation Needs (5/12/2025)    PRAPARE - Transportation     Lack of Transportation (Medical): Yes     Lack of Transportation (Non-Medical): Yes   Physical Activity: Sufficiently Active (5/12/2025)    Exercise Vital Sign     Days of Exercise per Week: 7 days     Minutes of Exercise per Session: 60 min   Stress: Stress Concern Present (5/12/2025)    [employer] - Occupational Stress Questionnaire     Feeling of Stress : Rather much   Housing Stability: High Risk (5/12/2025)    Housing Stability Vital Sign      Unable to Pay for Housing in the Last Year: Yes     Number of Times Moved in the Last Year: 3     Homeless in the Last Year: Yes       Time Tracking:     PT Received On: 05/13/25  PT Start Time: 0956     PT Stop Time: 1010  PT Total Time (min): 14 min     Billable Minutes: Evaluation 8 minutes    5/13/2025

## 2025-05-13 NOTE — SUBJECTIVE & OBJECTIVE
Medications:  Continuous Infusions:  Scheduled Meds:   acetaminophen  1,000 mg Oral Q8H    amLODIPine  10 mg Oral Daily    aspirin  81 mg Oral Daily    enoxparin  40 mg Subcutaneous Q24H (prophylaxis, 1700)    LIDOcaine  2 patch Transdermal Q24H    methocarbamoL  500 mg Oral TID    mupirocin   Nasal BID     PRN Meds:  Current Facility-Administered Medications:     0.9%  NaCl infusion (for blood administration), , Intravenous, Q24H PRN    docusate sodium, 50 mg, Oral, BID PRN    hydrALAZINE, 10 mg, Intravenous, Q4H PRN    HYDROmorphone, 0.5 mg, Intravenous, Q6H PRN    oxyCODONE, 5 mg, Oral, Q4H PRN    oxyCODONE, 10 mg, Oral, Q4H PRN    polyethylene glycol, 17 g, Oral, Daily PRN     Objective:     Vital Signs (Most Recent):  Temp: 98 °F (36.7 °C) (05/13/25 0400)  Pulse: 88 (05/13/25 0400)  Resp: 15 (05/13/25 0536)  BP: 125/83 (05/13/25 0400)  SpO2: 99 % (05/13/25 0400) Vital Signs (24h Range):  Temp:  [97.9 °F (36.6 °C)-98.9 °F (37.2 °C)] 98 °F (36.7 °C)  Pulse:  [86-99] 88  Resp:  [15-33] 15  SpO2:  [96 %-99 %] 99 %  BP: (118-135)/(77-92) 125/83          Physical Exam  Vitals reviewed.   Constitutional:       General: He is not in acute distress.     Appearance: Normal appearance.   HENT:      Head: Normocephalic and atraumatic.      Nose: Nose normal.   Cardiovascular:      Rate and Rhythm: Normal rate and regular rhythm.      Pulses: Normal pulses.      Comments: L DP 2+, R PT and DP signals    Pulmonary:      Effort: Pulmonary effort is normal. No respiratory distress.      Comments: Room air  Abdominal:      General: Abdomen is flat. There is no distension.      Palpations: Abdomen is soft.      Comments: Midline laparotomy incision c/d/i   Skin:     General: Skin is warm and dry.   Neurological:      General: No focal deficit present.      Mental Status: He is alert.      Comments: Alert, oriented          Significant Labs:  CBC:   Recent Labs   Lab 05/12/25  0637   WBC 14.18*   RBC 4.28*   HGB 12.8*   HCT  38.1*      MCV 89   MCH 29.9   MCHC 33.6     CMP:   Recent Labs   Lab 05/12/25  0637   GLU 88   CALCIUM 9.5   ALBUMIN 2.6*   PROT 7.0      K 4.1   CO2 26   CL 97   BUN 13   CREATININE 0.7   ALKPHOS 80   ALT 17   AST 26   BILITOT 1.1*       Significant Diagnostics:  I have reviewed all pertinent imaging results/findings within the past 24 hours.

## 2025-05-13 NOTE — PLAN OF CARE
PCU Plan of Care    Patient Dx: Ruptured infrarenal abdominal aortic aneurysm (AAA)    Vital Signs (last 12 hours):   Temp:  [97.7 °F (36.5 °C)-98.8 °F (37.1 °C)]   Pulse:  []   Resp:  [13-17]   BP: (104-117)/(71-81)   SpO2:  [94 %-98 %]      Neuro: AAOx4, Follows Commands, and Moves all extremities spontaneously     Cardiac: normal sinus rhythm    Respiratory: Room Air    Gtts:     Frequent Checks: None     Urine Output: Voids Spontaneously  750 mL/shift    Drains:     Diet: Regular    Mobility: Up to Chair  and Ambulated in [place]; Assistance Required: Stand-by Assist      Skin:  see LDA for lines, drains and incisions.  Patient turned q2h, bony prominences protected, and mattress inflated/working correctly.   Ebenezer Score: 21.    Shift Events:  .  See flowsheet for further assessment/details.  Family updated on current condition/plan of care, questions answered, and emotional support provided.  MD updated on current condition, vitals, labs, and gtts.

## 2025-05-13 NOTE — ASSESSMENT & PLAN NOTE
44-year-old gentleman presenting with 5 days of severe back pain, imaging concerning for symptomatic AAA due to connective tissue disorder.     S/p urgent open repair of ruptured AAA with 16x8 bifurcated dacron graft on 5/8. Now awake, extubated. Palpable pedal pulses bilaterally.     - regular diet as tolerated  - MMPC  - C/w ASA  - PT/OT  - Encourage OOBTC; should be in chair for every meal  - OK to shower  - Encourage IS  - Ok to SD to floor

## 2025-05-13 NOTE — PLAN OF CARE
Problem: Physical Therapy  Goal: Physical Therapy Goal  Outcome: Met    PT evaluation complete and no goals established. Pt is functioning at high level and does not required acute care physical therapy services. Education provided to ambulate in hallway 3x/day with independence in order to maintain strength and endurance. Pt verbalized understanding. Please re-consult if functional mobility changes. Anticipate d/c to home; no needs.     5/13/2025

## 2025-05-13 NOTE — PLAN OF CARE
PCU Plan of Care    Patient Dx: Ruptured infrarenal abdominal aortic aneurysm (AAA)    Vital Signs (last 12 hours):   Temp:  [97.9 °F (36.6 °C)-98.9 °F (37.2 °C)]   Pulse:  [86-95]   Resp:  [16-33]   BP: (120-126)/(80-85)   SpO2:  [96 %-98 %]      Neuro: AAOx4 and Follows Commands    Cardiac: normal sinus rhythm    Respiratory: Room Air    Gtts: n/a    Frequent Checks: None     Urine Output: Voids Spontaneously  750mL/shift    Drains: N/A    Diet: Clear Liquid     Mobility: Up to Chair ; Assistance Required: 1-person Assistance      Skin:  see LDA for lines, drains, and incisions.  Patient turned q2h, bony prominences protected, and mattress inflated/working correctly.   Ebenezer Score: 17.    Shift Events:  .  See flowsheet for further assessment/details.  Family updated on current condition/plan of care, questions answered, and emotional support provided.  MD updated on current condition, vitals, labs, and gtts.

## 2025-05-13 NOTE — PT/OT/SLP EVAL
Occupational Therapy  Co Evaluation and Discharge     Name: Dirk King  MRN: [id number]  Admitting Diagnosis: Ruptured infrarenal abdominal aortic aneurysm (AAA)  Recent Surgery: Procedure(s) (LRB):  REPAIR-ANEURYSM-ABDOMINAL-AORTIC (AAA) class a @0619 (N/A) 5 Days Post-Op    Recommendations:     Discharge Recommendations: No Therapy Indicated      Assessment:     Dirk King is a 45 y.o. male with a medical diagnosis of Ruptured infrarenal abdominal aortic aneurysm (AAA).  Pt tolerated session well and does not demo need for skilled OT at this time.         Plan:     D/C OT 5/13/2025     Subjective     Pt agreeable to therapy     Occupational Profile:  Pt lives with girlfriend in first floor apartment. Pt's uncle can assist on d/c as needed.   He was independent prior to arrival including driving and working. He has no DME/AE     Pain/Comfort:  Pain Rating 1: 5/10  Location 1: abdomen  Pain Addressed 1: Reposition, Distraction    Patients cultural, spiritual, Shinto conflicts given the current situation: no    Objective:     Communicated with: nsg prior to session.  Patient found in chair with no lines attached. He had just returned from taking shower with PCT assistance.   Coeval completed this date to optimize functional performance and safety   General Precautions: Standard,  fall    Occupational Performance:    Functional Mobility/Transfers:  Sit>stand with SBA     Activities of Daily Living:  Standing g/h skills with supervision.   Toileting with supervision simulated given pt's functional balance/strength/endurance.   LE dressing: MIN A to manage footwear.     Cognitive/Visual Perceptual:  Pt awake, alert and following commands.     Physical Exam:  Pt demo WFL UE strength/ROM, coordination.     AMPAC 6 Click ADL:  AMPAC Total Score: 18    Treatment & Education:  Pt completed functional mobility room with SBA. Pt moves slowly, but no LOB or SOB noted.   Education provided re: importance of  continued mobility and ADL skills as he remains in hospital. Pt receptive.       Patient left ambulatory in room/gonzalez with all lines intact and PT present    GOALS:   Multidisciplinary Problems       Occupational Therapy Goals       Not on file                    History:     History reviewed. No pertinent past medical history.      Past Surgical History:   Procedure Laterality Date    REPAIR OF ABDOMINAL AORTIC ANEURYSM N/A 5/8/2025    Procedure: REPAIR-ANEURYSM-ABDOMINAL-AORTIC (AAA) class a @0619;  Surgeon: JESSICA Rhodes III, MD;  Location: [facility] OR [address] [place] [address];  Service: Vascular;  Laterality: N/A;       Time Tracking:     OT Date of Treatment: 05/13/25  OT Start Time: 0956  OT Stop Time: 1005  OT Total Time (min): 9 min    Billable Minutes:Evaluation 9    5/13/2025

## 2025-05-13 NOTE — PROGRESS NOTES
[facility]  Vascular Surgery  Progress Note    Patient Name: Dirk King  MRN: [id number]  Admission Date: 5/8/2025  Primary Care Provider: Daysi, Primary Doctor    Subjective:     Interval History: NAEON. Tachycardia improved this AM to HR 90s. Pain well controlled on MMPC. Endorses flatus, denies BM. Midline incision c/d/I.     Post-Op Info:  Procedure(s) (LRB):  REPAIR-ANEURYSM-ABDOMINAL-AORTIC (AAA) class a @0619 (N/A)   5 Days Post-Op     Medications:  Continuous Infusions:  Scheduled Meds:   acetaminophen  1,000 mg Oral Q8H    amLODIPine  10 mg Oral Daily    aspirin  81 mg Oral Daily    enoxparin  40 mg Subcutaneous Q24H (prophylaxis, 1700)    LIDOcaine  2 patch Transdermal Q24H    methocarbamoL  500 mg Oral TID    mupirocin   Nasal BID     PRN Meds:  Current Facility-Administered Medications:     0.9%  NaCl infusion (for blood administration), , Intravenous, Q24H PRN    docusate sodium, 50 mg, Oral, BID PRN    hydrALAZINE, 10 mg, Intravenous, Q4H PRN    HYDROmorphone, 0.5 mg, Intravenous, Q6H PRN    oxyCODONE, 5 mg, Oral, Q4H PRN    oxyCODONE, 10 mg, Oral, Q4H PRN    polyethylene glycol, 17 g, Oral, Daily PRN     Objective:     Vital Signs (Most Recent):  Temp: 98 °F (36.7 °C) (05/13/25 0400)  Pulse: 88 (05/13/25 0400)  Resp: 15 (05/13/25 0536)  BP: 125/83 (05/13/25 0400)  SpO2: 99 % (05/13/25 0400) Vital Signs (24h Range):  Temp:  [97.9 °F (36.6 °C)-98.9 °F (37.2 °C)] 98 °F (36.7 °C)  Pulse:  [86-99] 88  Resp:  [15-33] 15  SpO2:  [96 %-99 %] 99 %  BP: (118-135)/(77-92) 125/83          Physical Exam  Vitals reviewed.   Constitutional:       General: He is not in acute distress.     Appearance: Normal appearance.   HENT:      Head: Normocephalic and atraumatic.      Nose: Nose normal.   Cardiovascular:      Rate and Rhythm: Normal rate and regular rhythm.      Pulses: Normal pulses.      Comments: L DP 2+, R PT and DP signals    Pulmonary:      Effort: Pulmonary effort is normal.  No respiratory distress.      Comments: Room air  Abdominal:      General: Abdomen is flat. There is no distension.      Palpations: Abdomen is soft.      Comments: Midline laparotomy incision c/d/i   Skin:     General: Skin is warm and dry.   Neurological:      General: No focal deficit present.      Mental Status: He is alert.      Comments: Alert, oriented          Significant Labs:  CBC:   Recent Labs   Lab 05/12/25  0637   WBC 14.18*   RBC 4.28*   HGB 12.8*   HCT 38.1*      MCV 89   MCH 29.9   MCHC 33.6     CMP:   Recent Labs   Lab 05/12/25  0637   GLU 88   CALCIUM 9.5   ALBUMIN 2.6*   PROT 7.0      K 4.1   CO2 26   CL 97   BUN 13   CREATININE 0.7   ALKPHOS 80   ALT 17   AST 26   BILITOT 1.1*       Significant Diagnostics:  I have reviewed all pertinent imaging results/findings within the past 24 hours.  Assessment/Plan:     * Ruptured infrarenal abdominal aortic aneurysm (AAA)  44-year-old gentleman presenting with 5 days of severe back pain, imaging concerning for symptomatic AAA due to connective tissue disorder.     S/p urgent open repair of ruptured AAA with 16x8 bifurcated dacron graft on 5/8. Now awake, extubated. Palpable pedal pulses bilaterally.     - regular diet as tolerated  - MMPC  - C/w ASA  - PT/OT  - Encourage OOBTC; should be in chair for every meal  - OK to shower  - Encourage IS  - Ok to SD to floor         Maida Oakley MD  Vascular Surgery  [facility]

## 2025-05-13 NOTE — PLAN OF CARE
PCU Plan of Care    Patient Dx: Ruptured infrarenal abdominal aortic aneurysm (AAA)    Vital Signs (last 12 hours):   Temp:  [98.9 °F (37.2 °C)]   Pulse:  [87-99]   Resp:  [16-33]   BP: (120-131)/(80-92)   SpO2:  [96 %-97 %]      Neuro: AAOx4 and Follows Commands    Cardiac: normal sinus rhythm    Respiratory: Room Air    Gtts:     Frequent Checks: None     Urine Output: Voids Spontaneously  900 mL/shift    Drains:     Diet: Clear Liquid     Mobility: Up to Chair ; Assistance Required: 1-person Assistance      Skin:  see LDA for lines, drains, and incisions.  Patient turned q2h, bony prominences protected, and mattress inflated/working correctly.   Ebenezer Score: 17.    Shift Events:  .  See flowsheet for further assessment/details.  Family updated on current condition/plan of care, questions answered, and emotional support provided.  MD updated on current condition, vitals, labs, and gtts.

## 2025-05-14 VITALS
RESPIRATION RATE: 13 BRPM | HEART RATE: 98 BPM | SYSTOLIC BLOOD PRESSURE: 147 MMHG | WEIGHT: 200 LBS | OXYGEN SATURATION: 97 % | BODY MASS INDEX: 31.39 KG/M2 | TEMPERATURE: 99 F | HEIGHT: 67 IN | DIASTOLIC BLOOD PRESSURE: 85 MMHG

## 2025-05-14 DIAGNOSIS — I71.33 RUPTURED INFRARENAL ABDOMINAL AORTIC ANEURYSM (AAA): Primary | ICD-10-CM

## 2025-05-14 LAB
ABSOLUTE EOSINOPHIL (OHS): 0.24 K/UL
ABSOLUTE MONOCYTE (OHS): 1.33 K/UL (ref 0.3–1)
ABSOLUTE NEUTROPHIL COUNT (OHS): 10.35 K/UL (ref 1.8–7.7)
ALBUMIN SERPL BCP-MCNC: 2.5 G/DL (ref 3.5–5.2)
ALP SERPL-CCNC: 88 UNIT/L (ref 40–150)
ALT SERPL W/O P-5'-P-CCNC: 18 UNIT/L (ref 10–44)
ANION GAP (OHS): 8 MMOL/L (ref 8–16)
AST SERPL-CCNC: 21 UNIT/L (ref 11–45)
BASOPHILS # BLD AUTO: 0.1 K/UL
BASOPHILS NFR BLD AUTO: 0.7 %
BILIRUB SERPL-MCNC: 0.5 MG/DL (ref 0.1–1)
BUN SERPL-MCNC: 11 MG/DL (ref 6–20)
CALCIUM SERPL-MCNC: 8.7 MG/DL (ref 8.7–10.5)
CHLORIDE SERPL-SCNC: 100 MMOL/L (ref 95–110)
CO2 SERPL-SCNC: 26 MMOL/L (ref 23–29)
CREAT SERPL-MCNC: 0.6 MG/DL (ref 0.5–1.4)
ERYTHROCYTE [DISTWIDTH] IN BLOOD BY AUTOMATED COUNT: 13 % (ref 11.5–14.5)
GFR SERPLBLD CREATININE-BSD FMLA CKD-EPI: >60 ML/MIN/1.73/M2
GLUCOSE SERPL-MCNC: 92 MG/DL (ref 70–110)
HCT VFR BLD AUTO: 34.9 % (ref 40–54)
HGB BLD-MCNC: 11.5 GM/DL (ref 14–18)
IMM GRANULOCYTES # BLD AUTO: 0.42 K/UL (ref 0–0.04)
IMM GRANULOCYTES NFR BLD AUTO: 2.9 % (ref 0–0.5)
LYMPHOCYTES # BLD AUTO: 2.21 K/UL (ref 1–4.8)
MAGNESIUM SERPL-MCNC: 1.6 MG/DL (ref 1.6–2.6)
MCH RBC QN AUTO: 28.8 PG (ref 27–31)
MCHC RBC AUTO-ENTMCNC: 33 G/DL (ref 32–36)
MCV RBC AUTO: 88 FL (ref 82–98)
NUCLEATED RBC (/100WBC) (OHS): 0 /100 WBC
PHOSPHATE SERPL-MCNC: 2.4 MG/DL (ref 2.7–4.5)
PLATELET # BLD AUTO: 455 K/UL (ref 150–450)
PMV BLD AUTO: 9.2 FL (ref 9.2–12.9)
POTASSIUM SERPL-SCNC: 3.6 MMOL/L (ref 3.5–5.1)
PROT SERPL-MCNC: 6.3 GM/DL (ref 6–8.4)
RBC # BLD AUTO: 3.99 M/UL (ref 4.6–6.2)
RELATIVE EOSINOPHIL (OHS): 1.6 %
RELATIVE LYMPHOCYTE (OHS): 15.1 % (ref 18–48)
RELATIVE MONOCYTE (OHS): 9.1 % (ref 4–15)
RELATIVE NEUTROPHIL (OHS): 70.6 % (ref 38–73)
SODIUM SERPL-SCNC: 134 MMOL/L (ref 136–145)
WBC # BLD AUTO: 14.65 K/UL (ref 3.9–12.7)

## 2025-05-14 PROCEDURE — 25000003 PHARM REV CODE 250

## 2025-05-14 PROCEDURE — 80053 COMPREHEN METABOLIC PANEL: CPT

## 2025-05-14 PROCEDURE — 36415 COLL VENOUS BLD VENIPUNCTURE: CPT

## 2025-05-14 PROCEDURE — 84100 ASSAY OF PHOSPHORUS: CPT | Performed by: STUDENT IN AN ORGANIZED HEALTH CARE EDUCATION/TRAINING PROGRAM

## 2025-05-14 PROCEDURE — 83735 ASSAY OF MAGNESIUM: CPT | Performed by: STUDENT IN AN ORGANIZED HEALTH CARE EDUCATION/TRAINING PROGRAM

## 2025-05-14 PROCEDURE — 85025 COMPLETE CBC W/AUTO DIFF WBC: CPT

## 2025-05-14 RX ORDER — ASPIRIN 81 MG/1
81 TABLET ORAL DAILY
Qty: 360 TABLET | Refills: 3 | Status: SHIPPED | OUTPATIENT
Start: 2025-05-15 | End: 2029-04-24

## 2025-05-14 RX ORDER — POLYETHYLENE GLYCOL 3350 17 G/17G
17 POWDER, FOR SOLUTION ORAL DAILY
Qty: 238 G | Refills: 0 | Status: SHIPPED | OUTPATIENT
Start: 2025-05-15

## 2025-05-14 RX ORDER — BISACODYL 10 MG/1
10 SUPPOSITORY RECTAL DAILY
Status: DISCONTINUED | OUTPATIENT
Start: 2025-05-14 | End: 2025-05-14 | Stop reason: HOSPADM

## 2025-05-14 RX ORDER — POLYETHYLENE GLYCOL 3350 17 G/17G
17 POWDER, FOR SOLUTION ORAL DAILY
Status: DISCONTINUED | OUTPATIENT
Start: 2025-05-14 | End: 2025-05-14 | Stop reason: HOSPADM

## 2025-05-14 RX ORDER — AMOXICILLIN 250 MG
2 CAPSULE ORAL 2 TIMES DAILY
Qty: 360 TABLET | Refills: 0 | Status: SHIPPED | OUTPATIENT
Start: 2025-05-14

## 2025-05-14 RX ORDER — AMLODIPINE BESYLATE 10 MG/1
10 TABLET ORAL DAILY
Qty: 90 TABLET | Refills: 3 | Status: SHIPPED | OUTPATIENT
Start: 2025-05-15 | End: 2026-05-15

## 2025-05-14 RX ORDER — ACETAMINOPHEN 500 MG
1000 TABLET ORAL EVERY 8 HOURS
Qty: 540 TABLET | Refills: 0 | Status: SHIPPED | OUTPATIENT
Start: 2025-05-14

## 2025-05-14 RX ORDER — AMOXICILLIN 250 MG
2 CAPSULE ORAL 2 TIMES DAILY
Status: DISCONTINUED | OUTPATIENT
Start: 2025-05-14 | End: 2025-05-14 | Stop reason: HOSPADM

## 2025-05-14 RX ORDER — IBUPROFEN 400 MG/1
400 TABLET, FILM COATED ORAL EVERY 6 HOURS PRN
Qty: 30 TABLET | Refills: 0 | Status: SHIPPED | OUTPATIENT
Start: 2025-05-14

## 2025-05-14 RX ADMIN — BISACODYL 10 MG: 10 SUPPOSITORY RECTAL at 08:05

## 2025-05-14 RX ADMIN — IBUPROFEN 800 MG: 400 TABLET ORAL at 06:05

## 2025-05-14 RX ADMIN — METHOCARBAMOL 500 MG: 500 TABLET ORAL at 08:05

## 2025-05-14 RX ADMIN — SENNOSIDES AND DOCUSATE SODIUM 2 TABLET: 50; 8.6 TABLET ORAL at 10:05

## 2025-05-14 RX ADMIN — METHOCARBAMOL 500 MG: 500 TABLET ORAL at 02:05

## 2025-05-14 RX ADMIN — ASPIRIN 81 MG: 81 TABLET, COATED ORAL at 08:05

## 2025-05-14 RX ADMIN — ACETAMINOPHEN 1000 MG: 500 TABLET ORAL at 02:05

## 2025-05-14 RX ADMIN — IBUPROFEN 800 MG: 400 TABLET ORAL at 02:05

## 2025-05-14 RX ADMIN — POLYETHYLENE GLYCOL 3350 17 G: 17 POWDER, FOR SOLUTION ORAL at 08:05

## 2025-05-14 RX ADMIN — MUPIROCIN: 20 OINTMENT TOPICAL at 08:05

## 2025-05-14 RX ADMIN — AMLODIPINE BESYLATE 10 MG: 10 TABLET ORAL at 08:05

## 2025-05-14 NOTE — DISCHARGE SUMMARY
HOSPITAL DISCHARGE SUMMARY      I.   IDENTIFYING DATA         A.  Name:Dirk King              Sex:male              MRN:[id number]              :1980              Date of admission:2025  2:10 AM              Date of discharge: 2025      II. SUCCINCT SUMMARY OF ADMISSION STATUS AND HOSPITAL COURSE    For details of hospital stay, please refer to daily progress notes. Briefly, this is a 45 y.o. male presented on  admitted for Ruptured infrarenal abdominal aortic aneurysm (AAA). Patient was taken to OR and underwent urgent Open repair of ruptured AAA with 16x8 bifurcated dacron graft. Post-operatively patient was admitted to the ICU for close monitoring. He was stepped down to PCU on POD 3 and has had an uncomplicated hospital recovery.    On the day of discharge, the patient was ambulating without difficulty, voiding spontaneously, was tolerating a diet without nausea or vomiting, and pain was well controlled on PO pain medications. Discharge instructions were explained to the patient and appropriate follow-up was arranged.      III. PATIENT'S MEDICAL CONDITION AT DISCHARGE       good    IV. SUMMARY OF PROCEDURE(S) PERFORMED          Procedure(s) (LRB):  REPAIR-ANEURYSM-ABDOMINAL-AORTIC (AAA) class a @0619 (N/A)      V.  DISCHARGE DIAGNOSES        Ruptured infrarenal abdominal aortic aneurysm (AAA)       VI. RESULTS PENDING AT TIME OF DISCHARGE         None    VII. MEDICATIONS TAKEN PRIOR TO ADMISSION    Current Outpatient Medications   Medication Instructions    acetaminophen (TYLENOL) 1,000 mg, Oral, Every 8 hours    [START ON 5/15/2025] amLODIPine (NORVASC) 10 mg, Oral, Daily    [START ON 5/15/2025] aspirin (ECOTRIN) 81 mg, Oral, Daily    ibuprofen (ADVIL,MOTRIN) 400 mg, Oral, Every 6 hours PRN    [START ON 5/15/2025] polyethylene glycol (GLYCOLAX) 17 g, Oral, Daily    senna-docusate (PERICOLACE) 8.6-50 mg per tablet 2 tablets, Oral, 2 times daily        VIII. MEDICATIONS TO BE TAKEN  FOLLOWING DISCHARGE       Medication List        START taking these medications      acetaminophen 500 MG tablet  Commonly known as: TYLENOL  Take 2 tablets (1,000 mg total) by mouth every 8 (eight) hours.     amLODIPine 10 MG tablet  Commonly known as: NORVASC  Take 1 tablet (10 mg total) by mouth once daily.  Start taking on: May 15, 2025     aspirin 81 MG EC tablet  Commonly known as: ECOTRIN  Take 1 tablet (81 mg total) by mouth once daily.  Start taking on: May 15, 2025     ibuprofen 800 MG tablet  Commonly known as: ADVIL,MOTRIN  Take 0.5 tablets (400 mg total) by mouth every 6 (six) hours as needed for Other.     polyethylene glycol 17 gram Pwpk  Commonly known as: GLYCOLAX  Take 17 g by mouth once daily.  Start taking on: May 15, 2025     senna-docusate 8.6-50 mg per tablet  Commonly known as: PERICOLACE  Take 2 tablets by mouth 2 (two) times daily.               Where to Get Your Medications        These medications were sent to [facility] [address], [place] [address]      Hours: Always Open Phone: [phone]   acetaminophen 500 MG tablet  amLODIPine 10 MG tablet  aspirin 81 MG EC tablet  ibuprofen 800 MG tablet  polyethylene glycol 17 gram Pwpk  senna-docusate 8.6-50 mg per tablet          IX.  DISCHARGE ORDERS AND INSTRUCTIONS    Discharge Procedure Orders   Diet Adult Regular     Change dressing (specify)   Order Comments: WOUND CARE  You have staples in place, these will be removed in 2 weeks  -- Ok to shower; however, no baths or submerging in water (I.e. swimming, submerging in water) for at least two weeks.  -- Please keep the incision clean with soap and water, pat your incision dry, do not scrub hard over your incisions.    MEDICATIONS AND PAIN CONTROL  -- Please resume all home medications as instructed and take any newly prescribed medications.  -- Most people find that over-the-counter pain medications (Tylenol combined with Ibuprofen) will be sufficient for most  pain control.  -- If you're taking prescription narcotics, do not drive or operate heavy machinery. Do not drive if your pain is not controlled enough for you to react quickly safely.  -- Take a stool softener with narcotics medications to prevent constipation.    OTHER INSTRUCTIONS  -- Monitor for temp > 101 F, bleeding, redness, purulent drainage, or any sudden, new extreme pain. If any occur, please call our clinic or go to the emergency department if after normal business hours.  -- You may resume your regular diet as tolerated.   -- No heavy lifting (anything >10 lbs or = to a gallon of milk) or strenuous exercise until cleared by a physician.  -- Follow up with Dr. Child in 4 weeks in clinic for a post-op check. If no appointment is made within the week, please call the clinic to schedule.     Notify your health care provider if you experience any of the following:  temperature >100.4     Notify your health care provider if you experience any of the following:  persistent nausea and vomiting or diarrhea     Notify your health care provider if you experience any of the following:  severe uncontrolled pain     Notify your health care provider if you experience any of the following:  redness, tenderness, or signs of infection (pain, swelling, redness, odor or green/yellow discharge around incision site)     Notify your health care provider if you experience any of the following:  difficulty breathing or increased cough     Notify your health care provider if you experience any of the following:  severe persistent headache     Notify your health care provider if you experience any of the following:  worsening rash     Notify your health care provider if you experience any of the following:  persistent dizziness, light-headedness, or visual disturbances     Notify your health care provider if you experience any of the following:  increased confusion or weakness     Activity as tolerated       X. DISCHARGE  DISPOSITION          Home-Health Care [facility]   discussed with patient, he will be living with girlfriend after discharge.      Maida Oakley MD  General Surgery PGY-1  05/14/2025

## 2025-05-14 NOTE — PROGRESS NOTES
[facility]  Vascular Surgery  Progress Note    Patient Name: Dirk King  MRN: [id number]  Admission Date: 5/8/2025  Primary Care Provider: Daysi, Primary Doctor    Subjective:     Interval History: NAEON. Regular diet tolerating well. Denies nausea and vomiting. Has not had bowel movement in several days.    Post-Op Info:  Procedure(s) (LRB):  REPAIR-ANEURYSM-ABDOMINAL-AORTIC (AAA) class a @0619 (N/A)   6 Days Post-Op     Medications:  Continuous Infusions:  Scheduled Meds:   acetaminophen  1,000 mg Oral Q8H    amLODIPine  10 mg Oral Daily    aspirin  81 mg Oral Daily    bisacodyL  10 mg Rectal Daily    enoxparin  40 mg Subcutaneous Q24H (prophylaxis, 1700)    ibuprofen  800 mg Oral Q8H    LIDOcaine  2 patch Transdermal Q24H    methocarbamoL  500 mg Oral TID    mupirocin   Nasal BID    polyethylene glycol  17 g Oral Daily     PRN Meds:  Current Facility-Administered Medications:     docusate sodium, 50 mg, Oral, BID PRN    hydrALAZINE, 10 mg, Intravenous, Q4H PRN    oxyCODONE, 5 mg, Oral, Q4H PRN     Objective:     Vital Signs (Most Recent):  Temp: 98.9 °F (37.2 °C) (05/14/25 0715)  Pulse: 103 (05/14/25 0724)  Resp: 16 (05/14/25 0724)  BP: (!) 140/73 (05/14/25 0724)  SpO2: 100 % (05/14/25 0733) Vital Signs (24h Range):  Temp:  [97.7 °F (36.5 °C)-98.9 °F (37.2 °C)] 98.9 °F (37.2 °C)  Pulse:  [] 103  Resp:  [15-23] 16  SpO2:  [95 %-100 %] 100 %  BP: (104-157)/(70-90) 140/73          Physical Exam  Vitals reviewed.   Constitutional:       General: He is not in acute distress.     Appearance: Normal appearance.   HENT:      Head: Normocephalic and atraumatic.      Nose: Nose normal.   Cardiovascular:      Rate and Rhythm: Normal rate and regular rhythm.      Pulses: Normal pulses.      Comments: L DP 2+, R PT and DP signals    Pulmonary:      Effort: Pulmonary effort is normal. No respiratory distress.      Comments: Room air  Abdominal:      General: Abdomen is flat. There is no  distension.      Palpations: Abdomen is soft.      Comments: Midline laparotomy incision c/d/i   Skin:     General: Skin is warm and dry.   Neurological:      General: No focal deficit present.      Mental Status: He is alert.      Comments: Alert, oriented          Significant Labs:  CBC:   Recent Labs   Lab 05/13/25  0438   WBC 12.60   RBC 4.06*   HGB 11.7*   HCT 35.5*      MCV 87   MCH 28.8   MCHC 33.0     CMP:   Recent Labs   Lab 05/14/25  0636   GLU 92   CALCIUM 8.7   ALBUMIN 2.5*   PROT 6.3   *   K 3.6   CO2 26      BUN 11   CREATININE 0.6   ALKPHOS 88   ALT 18   AST 21   BILITOT 0.5       Significant Diagnostics:  I have reviewed all pertinent imaging results/findings within the past 24 hours.  Assessment/Plan:     * Ruptured infrarenal abdominal aortic aneurysm (AAA)  44-year-old gentleman presenting with 5 days of severe back pain, imaging concerning for symptomatic AAA due to connective tissue disorder.     S/p urgent open repair of ruptured AAA with 16x8 bifurcated dacron graft on 5/8. Now awake, extubated. Palpable pedal pulses bilaterally.     - regular diet as tolerated  - aggressive bowel regimen  - MMPC  - C/W ASA  - PT/OT  - Encourage OOBTC; should be in chair for every meal  - OK to shower  - Encourage IS  - Ok for discharge    Dispo: home with home health PT/OT          Ayush Marquez MD  Vascular Surgery  [facility] General [facility]

## 2025-05-14 NOTE — PLAN OF CARE
[facility]    HOME HEALTH ORDERS  FACE TO FACE ENCOUNTER    Patient Name: Dirk King  YOB: 1980    PCP: Daysi, Primary Doctor   PCP Address: None  PCP Phone Number: None  PCP Fax: None    Encounter Date: 05/14/2025    Admit to Home Health    Diagnoses:  Active Hospital Problems    Diagnosis  POA    *Ruptured infrarenal abdominal aortic aneurysm (AAA) [I71.33]  Yes      Resolved Hospital Problems   No resolved problems to display.       No future appointments.        I have seen and examined this patient face to face today. My clinical findings that support the need for the home health skilled services and home bound status are the following:  Weakness/numbness causing balance and gait disturbance due to Surgery making it taxing to leave home.    Allergies:Review of patient's allergies indicates:  No Known Allergies    Diet: renal diet    Activities: activity as tolerated    Nursing:   SN to complete comprehensive assessment including routine vital signs. Instruct on disease process and s/s of complications to report to MD. Review/verify medication list sent home with the patient at time of discharge  and instruct patient/caregiver as needed. Frequency may be adjusted depending on start of care date.    Notify MD if SBP > 160 or < 90; DBP > 90 or < 50; HR > 120 or < 50; Temp > 101;     CONSULTS:    Physical Therapy to evaluate and treat. Evaluate for home safety and equipment needs. Establish/upgrade home exercise program. Perform/instruct on therapeutic exercises, gait training, transfer training, and range of motion.      Medications: Review discharge medications with patient and family and provide education.      There are no discharge medications for this patient.      I certify that this patient is confined to his home and needs physical therapy.    Maida Oakley MD  General Surgery PGY-1  05/14/2025

## 2025-05-14 NOTE — ASSESSMENT & PLAN NOTE
44-year-old gentleman presenting with 5 days of severe back pain, imaging concerning for symptomatic AAA due to connective tissue disorder.     S/p urgent open repair of ruptured AAA with 16x8 bifurcated dacron graft on 5/8. Now awake, extubated. Palpable pedal pulses bilaterally.     - regular diet as tolerated  - aggressive bowel regimen  - MMPC  - C/W ASA  - PT/OT  - Encourage OOBTC; should be in chair for every meal  - OK to shower  - Encourage IS  - Ok for discharge    Dispo: home with home health PT/OT

## 2025-05-14 NOTE — SUBJECTIVE & OBJECTIVE
Medications:  Continuous Infusions:  Scheduled Meds:   acetaminophen  1,000 mg Oral Q8H    amLODIPine  10 mg Oral Daily    aspirin  81 mg Oral Daily    bisacodyL  10 mg Rectal Daily    enoxparin  40 mg Subcutaneous Q24H (prophylaxis, 1700)    ibuprofen  800 mg Oral Q8H    LIDOcaine  2 patch Transdermal Q24H    methocarbamoL  500 mg Oral TID    mupirocin   Nasal BID    polyethylene glycol  17 g Oral Daily     PRN Meds:  Current Facility-Administered Medications:     docusate sodium, 50 mg, Oral, BID PRN    hydrALAZINE, 10 mg, Intravenous, Q4H PRN    oxyCODONE, 5 mg, Oral, Q4H PRN     Objective:     Vital Signs (Most Recent):  Temp: 98.9 °F (37.2 °C) (05/14/25 0715)  Pulse: 103 (05/14/25 0724)  Resp: 16 (05/14/25 0724)  BP: (!) 140/73 (05/14/25 0724)  SpO2: 100 % (05/14/25 0733) Vital Signs (24h Range):  Temp:  [97.7 °F (36.5 °C)-98.9 °F (37.2 °C)] 98.9 °F (37.2 °C)  Pulse:  [] 103  Resp:  [15-23] 16  SpO2:  [95 %-100 %] 100 %  BP: (104-157)/(70-90) 140/73          Physical Exam  Vitals reviewed.   Constitutional:       General: He is not in acute distress.     Appearance: Normal appearance.   HENT:      Head: Normocephalic and atraumatic.      Nose: Nose normal.   Cardiovascular:      Rate and Rhythm: Normal rate and regular rhythm.      Pulses: Normal pulses.      Comments: L DP 2+, R PT and DP signals    Pulmonary:      Effort: Pulmonary effort is normal. No respiratory distress.      Comments: Room air  Abdominal:      General: Abdomen is flat. There is no distension.      Palpations: Abdomen is soft.      Comments: Midline laparotomy incision c/d/i   Skin:     General: Skin is warm and dry.   Neurological:      General: No focal deficit present.      Mental Status: He is alert.      Comments: Alert, oriented          Significant Labs:  CBC:   Recent Labs   Lab 05/13/25  0438   WBC 12.60   RBC 4.06*   HGB 11.7*   HCT 35.5*      MCV 87   MCH 28.8   MCHC 33.0     CMP:   Recent Labs   Lab  05/14/25  0636   GLU 92   CALCIUM 8.7   ALBUMIN 2.5*   PROT 6.3   *   K 3.6   CO2 26      BUN 11   CREATININE 0.6   ALKPHOS 88   ALT 18   AST 21   BILITOT 0.5       Significant Diagnostics:  I have reviewed all pertinent imaging results/findings within the past 24 hours.

## 2025-05-14 NOTE — PLAN OF CARE
CM spoke with Patient Caregiver Erich Lin (Uncle)  [phone] (Mobile) will pick patient up from hospital for discharge after 2 pm. Patient will return home with Girlfriend but uncle is still Emergency .     Sylvester Velasquez RN, BSN  Case Management  [phone]

## 2025-05-14 NOTE — PLAN OF CARE
[facility]  Discharge Final Note    Primary Care Provider: Daysi, Primary Doctor    Expected Discharge Date: 5/14/2025    Patient discharged to home via Uncle transportation.     Patient's bedside nurse provided discharge instructions.    Discharge Plan A and Plan B have been determined by review of patient's clinical status, future medical and therapeutic needs, and coverage/benefits for post-acute care in coordination with multidisciplinary team members.        Final Discharge Note (most recent)       Final Note - 05/12/25 1501          Final Note    Assessment Type Discharge Planning Assessment                     Important Message from Medicare             Contact Info       JESSICA Rhodes III, MD   Specialty: Vascular Surgery    [address]  [facility] [address]   Phone: [phone]       Next Steps: Follow up on 6/3/2025    Instructions: at 11 AM    [facility]   Specialty: Behavioral Health, Psychiatry, Family Medicine    [address] [facility] [address]   Phone: [phone]       Next Steps: Follow up on 5/19/2025    Instructions: Hospital follow up new patient @ 11:15 am. Please bring discharge summary, ID, insurance card, and medication list.            Future Appointments   Date Time Provider Department Center   6/3/2025 10:30 AM VASCULAR, LAB [facility]   6/3/2025 11:00 AM JESSICA Rhodes III, MD [facility]        scheduled post-discharge follow-up appointment and information added to AVS.     Sylvester Velasquez, RN, BSN  Case Management  [phone]

## 2025-05-14 NOTE — PLAN OF CARE
PCU Plan of Care    Patient Dx: Ruptured infrarenal abdominal aortic aneurysm (AAA)    Vital Signs (last 12 hours):   Temp:  [98.1 °F (36.7 °C)-98.9 °F (37.2 °C)]   Pulse:  [78-96]   Resp:  [15-18]   BP: (108-136)/(70-77)   SpO2:  [97 %-98 %]      Neuro: AAOx4 and Follows Commands    Cardiac: normal sinus rhythm    Respiratory: Room Air    Gtts: n/a    Frequent Checks: None     Urine Output: Voids Spontaneously  750mL/shift    Drains: N/A    Diet: Regular    Mobility: Up to Chair ; Assistance Required: Stand by     Skin:  see LDA for lines, drains, and incisions.  Patient turned q2h, bony prominences protected, and mattress inflated/working correctly.   Ebenezer Score: 20.    Shift Events:  .  See flowsheet for further assessment/details.  Family updated on current condition/plan of care, questions answered, and emotional support provided.  MD updated on current condition, vitals, labs, and gtts.

## 2025-05-16 NOTE — OP NOTE
"Lex Nevarez MD   Resident  Vascular Surgery     Op Note      Signed     Creation Time: 5/8/2025  2:44 PM       OPERATIVE REPORT     Patient: Dirk King  Surgery Date: 05/08/2025     Surgeons and Role:             * Freddy Rodriguez MD - 1st assist     A 2nd attending surgeon was required for the critical portions of this case despite having a trainee involved, because of the complexity the of this operation     Assisting Surgeon: None     Pre-op Diagnosis: Ruptured AAA, probable connective dissue disorder     Post-op Diagnosis:   same, + liver lesion, likely hemangioma     PROCEDURE:   Urgent Open repair of ruptured AAA with 16x8 bifurcated dacron graft  2.   Aortic biopsy     CODING NOTE: -22 Modifier appropriate given the increased complexity and length of this case      Anesthesia: General     Implants:  Implant Name Type Inv. Item Serial No.  Lot No. LRB No. Used Action   FELT KVNG [id number] - [id number]   [employer] [id number]   [employer] [id number]   1 Implanted   INTERGARD KNITTED 16X8 mm     [id number] INTERVASCULAR [id number]   1 Implanted         Operative Findings: Clear rupture with heme in the retro-P     Infra-renal crossclamp; Extensive bleeding from an existing aortic dissection, intercostals and the R hypogastric;      End-to-end proximal anastamosis  R limb sewn end-to end to the R external iliac, R hypo ligated/oversewn  L limb sewn to the ostial R common iliac (15mm) using a "pantallon" spatulation of the limb     Aortic wall specimen sent to pathology for connective tissue evaluation     Estimated Blood Loss: 65686 mL            Specimens:   Specimen (24h ago, onward)                  Start     Ordered     05/08/25 1228   Specimen to Pathology Other (vascular surgery)  RELEASE UPON ORDERING        References:    Click here for ordering Quick Tip   Question:  Release to patient  Answer:  Immediate    05/08/25 1228                       ID Type Source Tests Collected by Time " Destination   1 : Rule out connective tissue disorder Tissue Aorta SPECIMEN TO PATHOLOGY JESSICA Rhodes III, MD 5/8/2025 [id number]           Complications: None     Indications for Procedure:  Dirk King is a 44 y.o. male who presented with a symptomatic, ruptured infrarenal abdominal aortic aneurysms.  He was offered an urgent open repair of his aneurysm. All risks, benefits, and alternatives were discussed and the patient understood and wished to proceed and gave written consent.      Operative Details:   The patient was brought to the operating room and placed in the operating room table in supine position.  He underwent general anesthesia.  Preoperative IVs, central line, Bunch, A-line were placed the abdomen and groins were prepped and draped in the usual sterile fashion.  Perioperative antibiotics were administered.  A time-out was performed.     A midline incision was made from the right of the xiphoid process, down to the pubic tubercle, curving around the right umbilicus.  Was carried down through the subcutaneous tissue.  The fascia was encountered.  At the superior aspect of the incision, the fascia was opened.  The peritoneum was then entered sharply.  The rest of the incision was then opened.     Upon initial immediate exploration, we 1st noticed a quite large, approximately 5-6 cm liver lesion next to the gallbladder fossa.  There did not appear to be any other masses within the belly.  The colon was then retracted cephalad.  The small bowel was retracted to the right.  A bowel for retractor was placed in the belly to assist with wall retraction.     We were able to immediately identified the retroperitoneum, and here it was clear that there was rupture of his aneurysms with heme in the retroperitoneum.  There was no active bleeding through the retroperitoneum.  We began by incising the retroperitoneum next to the duodenum, and extended this dissection upwards, without entering the bleeding  space.  An Omni retractor was used to assist for retraction.  Small venous branches, lymphatics, were tied and clipped.  Proximally, we are able to identify the left renal vein, as well as the left renal artery.  Here, the aorta was not aneurysmal.  We are able to dissected down on either side of the aorta, down to the spine.  Of note, we did not note that he had a circumaortic renal vein, at this level, we were deemed to be positioned cranial to it.     Next, we made a separate incision along the left iliac artery.  The left iliac artery was noted to be quite large, about 15 mm in size.  This was dissected on either side, the left iliac vein noted to be adjacent to the artery.     To gain control of the right side, given the significant right common iliac aneurysm, we planned to sew our anastomosis to the right external iliac artery.  The right external iliac artery was circumferentially dissected free, controlled with an umbilical tape.  We continued the dissection more proximally, to the bifurcation of the common and hypogastric.  We took care to identify the ureter crossing over the iliac limb, which was visualized throughout the case.  The hypogastric was circumferentially dissected free, given that we planned to sew to the external iliac artery, we plan to ligate the hypogastric, and the hypogastric was doubly ligated with 2 0 silk ties.     Now that we had control, the patient was then systemically heparinized with IV heparin to achieve an ACT greater than 250 throughout the case.  The aneurysm sac was then entered with Bovie electrocautery, extended with scissors.  There was significant extensive bleeding present from an existing aortic dissection, and we could see evidence of the posterior left lateral disruption and rupture of the aorta.  The lumbars were oversewn initially with 2-0 silks, however we did note that the tissue was quite poor in quality, consistent with his suspected etiology of a connective  tissue disorder, so we transitioned to 3-0 Prolenes with pledgets, which did not tear the tissue as significantly.  The NICOLETTE was also identified and bleeding, it had pulsatile inflow, and was ligated with a silk suture.  Distally, the right hypogastric was oversewn from the inside with additional 2-0 silks.      Once the bleeding was somewhat controlled, we brought up a 16 x 8 mm bifurcated Dacron graft onto the field.  The main body was shortened.  The proximal aorta was T'd off in the infrarenal position.  The Dacron graft was then sewn onto the proximal anastomosis with a 3-0 Prolene with the assistance of a felt strip.  This was started with a U-stitch, and run on either side, to me on top.  An additional felt strip was placed.  Following completion, the graft was clamped, and the proximal clamp partially released.  The anastomosis appeared hemostatic.  There was some bleeding at the posterior aspect of the graft, for which an additional 3-0 Prolene U-stitch with a felt pledget was placed for reinforcement of the posterior graft to the periaortic tissue.  There was an additional lumbar present at this posterior aspect, which was oversewn in a similar fashion, with significantly improved hemostasis.     Next, we turned our attention to the right anastomosis.  To allow for closure of the aneurysm sac and retroperitoneum over the right iliac limb, we tunneled our graft limb below the ureter.  The external iliac artery was then transected.  The graft was trimmed to size to meet the iliac artery. We sewed the anastomosis with a 4-0 Prolene in a running fashion starting at the posterior aspect.  With the initial bite, we did note that the artery here even appeared to be poor and tissue quality, so we used a reinforced felt strip for the anastomosis.  Prior to completion, the artery was back bled from the iliac, forward bled from the graft.  The repair was then completed.  The repair was hemostatic, no additional  sutures were required.      Following this, we turned our attention to the left anastomosis.  Given that the left common iliac was also ectatic in size, we would have to do a significant spatulation of the iliac limb.  The left common iliac was then T'd off near the origin.  The left limb was then spatulated in pantaloon fashion fashion anteriorly and posteriorly to allow for the size match up to the large iliac.  Given the ectatic size, we also elected to use a reinforced felt strip for the repair.  A 4-0 Prolene was used, starting of the posterior aspect of the anastomosis, and run along either side.  Prior to completion, the artery was back bled, forward bled.  The repair was then completed.  There was some laxity in the left iliac limb, however no kinking present. No repair sutures were required.     There was a strong pulse present in the bilateral iliac arteries.  The heparin was then reversed with protamine.  Manual pressure was held for 5 minutes, and the wound inspected.  Hemostasis was carefully achieved.  The retroperitoneum and aneurysm sac were then reapproximated over the graft over the bilateral iliacs, to the proximal aorta to cover the anastomosis.  A portion of the aortic tissue was sent off for pathology to rule out connective tissue disorder.      We did consult Surgical Oncology intraoperatively for evaluation of the liver mass, who deemed that it would be appropriate for outpatient follow-up once additional specialized imaging was performed for evaluation.      The fascia was then closed with #1 Looped PDS.  The wound was then irrigated, closed with skin staples.  Dry sterile dressings were applied.      The patient remained intubated, and brought up to the ICU in stable condition.           All instrument and sponge counts were confirmed correct. The family was notified of the results of the surgery afterwards.

## 2025-05-27 ENCOUNTER — TELEPHONE (OUTPATIENT)
Dept: HEMATOLOGY/ONCOLOGY | Facility: CLINIC | Age: 45
End: 2025-05-27
Payer: MEDICAID

## 2025-05-27 DIAGNOSIS — R16.0 LIVER MASS: Primary | ICD-10-CM

## 2025-05-29 ENCOUNTER — TELEPHONE (OUTPATIENT)
Dept: HEMATOLOGY/ONCOLOGY | Facility: CLINIC | Age: 45
End: 2025-05-29
Payer: MEDICAID

## 2025-05-30 ENCOUNTER — TELEPHONE (OUTPATIENT)
Dept: HEMATOLOGY/ONCOLOGY | Facility: CLINIC | Age: 45
End: 2025-05-30
Payer: MEDICAID

## 2025-06-02 ENCOUNTER — TELEPHONE (OUTPATIENT)
Dept: HEMATOLOGY/ONCOLOGY | Facility: CLINIC | Age: 45
End: 2025-06-02
Payer: MEDICAID

## 2025-06-03 ENCOUNTER — OFFICE VISIT (OUTPATIENT)
Dept: VASCULAR SURGERY | Facility: CLINIC | Age: 45
End: 2025-06-03
Payer: MEDICAID

## 2025-06-03 ENCOUNTER — HOSPITAL ENCOUNTER (OUTPATIENT)
Dept: VASCULAR SURGERY | Facility: CLINIC | Age: 45
Discharge: HOME | End: 2025-06-03
Payer: MEDICAID

## 2025-06-03 VITALS
WEIGHT: 176.38 LBS | TEMPERATURE: 98 F | BODY MASS INDEX: 27.68 KG/M2 | DIASTOLIC BLOOD PRESSURE: 83 MMHG | HEIGHT: 67 IN | HEART RATE: 92 BPM | SYSTOLIC BLOOD PRESSURE: 122 MMHG

## 2025-06-03 DIAGNOSIS — I71.33 RUPTURED INFRARENAL ABDOMINAL AORTIC ANEURYSM (AAA): Primary | ICD-10-CM

## 2025-06-03 DIAGNOSIS — I71.33 RUPTURED INFRARENAL ABDOMINAL AORTIC ANEURYSM (AAA): ICD-10-CM

## 2025-06-03 PROCEDURE — 1160F RVW MEDS BY RX/DR IN RCRD: CPT | Mod: CPTII,,, | Performed by: SURGERY

## 2025-06-03 PROCEDURE — 1159F MED LIST DOCD IN RCRD: CPT | Mod: CPTII,,, | Performed by: SURGERY

## 2025-06-03 PROCEDURE — 99024 POSTOP FOLLOW-UP VISIT: CPT | Mod: ,,, | Performed by: SURGERY

## 2025-06-03 PROCEDURE — 99213 OFFICE O/P EST LOW 20 MIN: CPT | Mod: PBBFAC | Performed by: SURGERY

## 2025-06-03 PROCEDURE — 3079F DIAST BP 80-89 MM HG: CPT | Mod: CPTII,,, | Performed by: SURGERY

## 2025-06-03 PROCEDURE — 93978 VASCULAR STUDY: CPT | Mod: 26,S$PBB,, | Performed by: SURGERY

## 2025-06-03 PROCEDURE — 3074F SYST BP LT 130 MM HG: CPT | Mod: CPTII,,, | Performed by: SURGERY

## 2025-06-03 PROCEDURE — 93978 VASCULAR STUDY: CPT | Mod: PBBFAC | Performed by: SURGERY

## 2025-06-03 PROCEDURE — 99999 PR PBB SHADOW E&M-EST. PATIENT-LVL III: CPT | Mod: PBBFAC,,, | Performed by: SURGERY

## 2025-06-04 ENCOUNTER — HOSPITAL ENCOUNTER (OUTPATIENT)
Dept: RADIOLOGY | Facility: OTHER | Age: 45
Discharge: HOME | End: 2025-06-04
Attending: SURGERY
Payer: MEDICAID

## 2025-06-04 ENCOUNTER — TELEPHONE (OUTPATIENT)
Dept: GENETICS | Facility: CLINIC | Age: 45
End: 2025-06-04
Payer: MEDICAID

## 2025-06-04 DIAGNOSIS — R16.0 LIVER MASS: ICD-10-CM

## 2025-06-04 PROCEDURE — 74183 MRI ABD W/O CNTR FLWD CNTR: CPT | Mod: 26,,, | Performed by: RADIOLOGY

## 2025-06-04 PROCEDURE — 74183 MRI ABD W/O CNTR FLWD CNTR: CPT | Mod: TC

## 2025-06-04 PROCEDURE — 25500020 PHARM REV CODE 255: Performed by: SURGERY

## 2025-06-04 PROCEDURE — A9585 GADOBUTROL INJECTION: HCPCS | Performed by: SURGERY

## 2025-06-04 RX ORDER — GADOBUTROL 604.72 MG/ML
8 INJECTION INTRAVENOUS
Status: COMPLETED | OUTPATIENT
Start: 2025-06-04 | End: 2025-06-04

## 2025-06-04 RX ADMIN — GADOBUTROL 8 ML: 604.72 INJECTION INTRAVENOUS at 12:06

## 2025-06-05 ENCOUNTER — OFFICE VISIT (OUTPATIENT)
Dept: SURGERY | Facility: CLINIC | Age: 45
End: 2025-06-05
Payer: MEDICAID

## 2025-06-05 VITALS
HEART RATE: 85 BPM | BODY MASS INDEX: 29.13 KG/M2 | WEIGHT: 185.94 LBS | OXYGEN SATURATION: 99 % | DIASTOLIC BLOOD PRESSURE: 75 MMHG | SYSTOLIC BLOOD PRESSURE: 121 MMHG

## 2025-06-05 DIAGNOSIS — K76.89 FOCAL NODULAR HYPERPLASIA OF LIVER: Primary | ICD-10-CM

## 2025-06-05 PROCEDURE — 3078F DIAST BP <80 MM HG: CPT | Mod: CPTII,,, | Performed by: SURGERY

## 2025-06-05 PROCEDURE — 3074F SYST BP LT 130 MM HG: CPT | Mod: CPTII,,, | Performed by: SURGERY

## 2025-06-05 PROCEDURE — 1159F MED LIST DOCD IN RCRD: CPT | Mod: CPTII,,, | Performed by: SURGERY

## 2025-06-05 PROCEDURE — 99213 OFFICE O/P EST LOW 20 MIN: CPT | Mod: PBBFAC | Performed by: SURGERY

## 2025-06-05 PROCEDURE — 3008F BODY MASS INDEX DOCD: CPT | Mod: CPTII,,, | Performed by: SURGERY

## 2025-06-05 PROCEDURE — 1111F DSCHRG MED/CURRENT MED MERGE: CPT | Mod: CPTII,,, | Performed by: SURGERY

## 2025-06-05 PROCEDURE — 99203 OFFICE O/P NEW LOW 30 MIN: CPT | Mod: S$PBB,,, | Performed by: SURGERY

## 2025-06-05 PROCEDURE — 99999 PR PBB SHADOW E&M-EST. PATIENT-LVL III: CPT | Mod: PBBFAC,,, | Performed by: SURGERY

## 2025-06-06 DIAGNOSIS — I71.33 RUPTURED INFRARENAL ABDOMINAL AORTIC ANEURYSM (AAA): Primary | ICD-10-CM

## (undated) DEVICE — TUBING SUC UNIV W/CONN 12FT

## (undated) DEVICE — SUT 3/0 48IN PROLENE BL MO

## (undated) DEVICE — HEMOSTAT SURGICEL NU-KNIT 6X9

## (undated) DEVICE — DECANTER FLUID TRNSF WHITE 9IN

## (undated) DEVICE — SUT SILK 3-0 STRANDS 30IN

## (undated) DEVICE — WIPE ESENTA BARR STNG FREE 3ML

## (undated) DEVICE — INTERGARD KNITTED BIFURCATED COLLAGEN COATED VASCULAR GRAFT
Type: IMPLANTABLE DEVICE | Site: AORTA | Status: NON-FUNCTIONAL
Brand: INTERGARD

## (undated) DEVICE — SOL NACL 0.9% IV INJ 500ML

## (undated) DEVICE — SUT PROLENE 5-0 36 V-5 V-5

## (undated) DEVICE — TOWEL OR DISP STRL BLUE 4/PK

## (undated) DEVICE — APPLICATOR CHLORAPREP ORN 26ML

## (undated) DEVICE — TOWEL OR XRAY WHITE 17X26IN

## (undated) DEVICE — SUT 1 48IN PDS II VIO MONO

## (undated) DEVICE — SUT PROLENE 4-0 SH BLU 36IN

## (undated) DEVICE — SUT SILK 2-0 STRANDS 30IN

## (undated) DEVICE — ELECTRODE MEGADYNE RETURN DUAL

## (undated) DEVICE — GOWN SURGICAL X-LARGE

## (undated) DEVICE — SUT PROLENE 5-0 36IN C-1

## (undated) DEVICE — CATH RED RUBBER 24FR

## (undated) DEVICE — COVER LIGHT HANDLE 80/CA

## (undated) DEVICE — STAPLER SKIN PROXIMATE WIDE

## (undated) DEVICE — NDL BOX COUNTER

## (undated) DEVICE — SUT 2/0 30IN SILK BLACK

## (undated) DEVICE — LOOP VESSEL BLUE MAXI

## (undated) DEVICE — INSERTS STEALTH SOFT SIZE 3

## (undated) DEVICE — DRESSING AQUACEL SACRAL 9 X 9

## (undated) DEVICE — GOWN NONREINF SET-IN SLV 2XL

## (undated) DEVICE — SUT 2-0 VICRYL / CT-1

## (undated) DEVICE — HEMOSTAT SURGICEL 4X8IN

## (undated) DEVICE — CLIP MED TICALL

## (undated) DEVICE — DRAPE INCISE IOBAN 2 23X33IN

## (undated) DEVICE — Device

## (undated) DEVICE — KIT SAHARA DRAPE DRAW/LIFT

## (undated) DEVICE — SPONGE LAP 18X18 PREWASHED

## (undated) DEVICE — PAD K-THERMIA 24IN X 60IN

## (undated) DEVICE — SUT PROLENE 3-0 SH DA 36 BL

## (undated) DEVICE — DRAPE STERI INSTRUMENT 1018

## (undated) DEVICE — TRAY PERIPHERAL VASCULAR OMC